# Patient Record
Sex: MALE | Race: WHITE | NOT HISPANIC OR LATINO | Employment: OTHER | URBAN - METROPOLITAN AREA
[De-identification: names, ages, dates, MRNs, and addresses within clinical notes are randomized per-mention and may not be internally consistent; named-entity substitution may affect disease eponyms.]

---

## 2022-12-13 ENCOUNTER — TRANSCRIBE ORDERS (OUTPATIENT)
Dept: ADMINISTRATIVE | Facility: HOSPITAL | Age: 77
End: 2022-12-13

## 2022-12-13 DIAGNOSIS — K44.9 DIAPHRAGMATIC HERNIA WITHOUT OBSTRUCTION OR GANGRENE: Primary | ICD-10-CM

## 2024-05-03 ENCOUNTER — TRANSCRIBE ORDERS (OUTPATIENT)
Dept: ADMINISTRATIVE | Facility: HOSPITAL | Age: 79
End: 2024-05-03
Payer: MEDICARE

## 2024-05-03 DIAGNOSIS — K44.9 DIAPHRAGMATIC HERNIA WITHOUT OBSTRUCTION OR GANGRENE: Primary | ICD-10-CM

## 2024-05-09 ENCOUNTER — HOSPITAL ENCOUNTER (OUTPATIENT)
Dept: GENERAL RADIOLOGY | Facility: HOSPITAL | Age: 79
Discharge: HOME | End: 2024-05-09
Admitting: SURGERY
Payer: MEDICARE

## 2024-05-09 DIAGNOSIS — K44.9 DIAPHRAGMATIC HERNIA WITHOUT OBSTRUCTION OR GANGRENE: ICD-10-CM

## 2024-05-09 PROCEDURE — 63710000001 BARIUM SULFATE 96 % RECONSTITUTED SUSPENSION: Performed by: SURGERY

## 2024-05-09 PROCEDURE — A9270 NON-COVERED ITEM OR SERVICE: HCPCS | Performed by: SURGERY

## 2024-05-09 PROCEDURE — 74220 X-RAY XM ESOPHAGUS 1CNTRST: CPT

## 2024-05-09 RX ADMIN — BARIUM SULFATE 183 ML: 960 POWDER, FOR SUSPENSION ORAL at 11:18

## 2024-05-31 PROCEDURE — 88305 TISSUE EXAM BY PATHOLOGIST: CPT

## 2024-06-03 ENCOUNTER — LAB REQUISITION (OUTPATIENT)
Dept: LAB | Facility: HOSPITAL | Age: 79
End: 2024-06-03
Payer: MEDICARE

## 2024-06-03 DIAGNOSIS — K44.9 DIAPHRAGMATIC HERNIA WITHOUT OBSTRUCTION OR GANGRENE: ICD-10-CM

## 2024-06-04 LAB — REF LAB TEST METHOD: NORMAL

## 2025-04-11 ENCOUNTER — HOSPITAL ENCOUNTER (INPATIENT)
Facility: HOSPITAL | Age: 80
LOS: 3 days | Discharge: NURSING, REHAB OR LONG-TERM CARE | DRG: 063 | End: 2025-04-14
Attending: EMERGENCY MEDICINE | Admitting: HOSPITALIST
Payer: MEDICARE

## 2025-04-11 ENCOUNTER — APPOINTMENT (OUTPATIENT)
Dept: CT IMAGING | Facility: HOSPITAL | Age: 80
DRG: 063 | End: 2025-04-11
Payer: MEDICARE

## 2025-04-11 ENCOUNTER — APPOINTMENT (OUTPATIENT)
Dept: GENERAL RADIOLOGY | Facility: HOSPITAL | Age: 80
DRG: 063 | End: 2025-04-11
Payer: MEDICARE

## 2025-04-11 ENCOUNTER — APPOINTMENT (OUTPATIENT)
Dept: MRI IMAGING | Facility: HOSPITAL | Age: 80
DRG: 063 | End: 2025-04-11
Payer: MEDICARE

## 2025-04-11 DIAGNOSIS — I63.9 ACUTE CVA (CEREBROVASCULAR ACCIDENT): ICD-10-CM

## 2025-04-11 DIAGNOSIS — R27.0 ATAXIA: ICD-10-CM

## 2025-04-11 DIAGNOSIS — Z86.79 HISTORY OF HYPERTENSION: ICD-10-CM

## 2025-04-11 DIAGNOSIS — R47.81 SLURRED SPEECH: ICD-10-CM

## 2025-04-11 DIAGNOSIS — R53.1 ACUTE LEFT-SIDED WEAKNESS: Primary | ICD-10-CM

## 2025-04-11 PROBLEM — G43.909 MIGRAINE: Status: ACTIVE | Noted: 2018-09-17

## 2025-04-11 PROBLEM — J30.2 SEASONAL ALLERGIC RHINITIS: Status: ACTIVE | Noted: 2020-10-27

## 2025-04-11 PROBLEM — E11.9 T2DM (TYPE 2 DIABETES MELLITUS): Chronic | Status: ACTIVE | Noted: 2025-04-11

## 2025-04-11 PROBLEM — E78.5 DYSLIPIDEMIA: Chronic | Status: ACTIVE | Noted: 2025-04-11

## 2025-04-11 PROBLEM — N40.0 BPH (BENIGN PROSTATIC HYPERPLASIA): Chronic | Status: ACTIVE | Noted: 2025-04-11

## 2025-04-11 PROBLEM — E55.9 VITAMIN D DEFICIENCY: Status: ACTIVE | Noted: 2023-09-14

## 2025-04-11 LAB
ALBUMIN SERPL-MCNC: 3.6 G/DL (ref 3.5–5.2)
ALBUMIN/GLOB SERPL: 1.2 G/DL
ALP SERPL-CCNC: 63 U/L (ref 39–117)
ALT SERPL W P-5'-P-CCNC: 13 U/L (ref 1–41)
ALT SERPL W P-5'-P-CCNC: 13 U/L (ref 1–41)
ANION GAP SERPL CALCULATED.3IONS-SCNC: 10 MMOL/L (ref 5–15)
APTT PPP: 28.8 SECONDS (ref 22–39)
AST SERPL-CCNC: 17 U/L (ref 1–40)
AST SERPL-CCNC: 18 U/L (ref 1–40)
BASOPHILS # BLD AUTO: 0.04 10*3/MM3 (ref 0–0.2)
BASOPHILS NFR BLD AUTO: 0.7 % (ref 0–1.5)
BILIRUB SERPL-MCNC: 1 MG/DL (ref 0–1.2)
BUN BLDA-MCNC: 16 MG/DL (ref 8–26)
BUN SERPL-MCNC: 15 MG/DL (ref 8–23)
BUN/CREAT SERPL: 16.9 (ref 7–25)
CA-I BLDA-SCNC: 1.31 MMOL/L (ref 1.2–1.32)
CALCIUM SPEC-SCNC: 9.7 MG/DL (ref 8.6–10.5)
CHLORIDE BLDA-SCNC: 103 MMOL/L (ref 98–109)
CHLORIDE SERPL-SCNC: 102 MMOL/L (ref 98–107)
CO2 BLDA-SCNC: 36 MMOL/L (ref 24–29)
CO2 SERPL-SCNC: 25 MMOL/L (ref 22–29)
CREAT BLDA-MCNC: 1 MG/DL (ref 0.6–1.3)
CREAT SERPL-MCNC: 0.89 MG/DL (ref 0.76–1.27)
DEPRECATED RDW RBC AUTO: 42.2 FL (ref 37–54)
EGFRCR SERPLBLD CKD-EPI 2021: 75.1 ML/MIN/1.73
EGFRCR SERPLBLD CKD-EPI 2021: 85.6 ML/MIN/1.73
EOSINOPHIL # BLD AUTO: 0.26 10*3/MM3 (ref 0–0.4)
EOSINOPHIL NFR BLD AUTO: 4.5 % (ref 0.3–6.2)
ERYTHROCYTE [DISTWIDTH] IN BLOOD BY AUTOMATED COUNT: 12.4 % (ref 12.3–15.4)
GLOBULIN UR ELPH-MCNC: 3.1 GM/DL
GLUCOSE BLDC GLUCOMTR-MCNC: 152 MG/DL (ref 70–130)
GLUCOSE BLDC GLUCOMTR-MCNC: 181 MG/DL (ref 70–130)
GLUCOSE BLDC GLUCOMTR-MCNC: 256 MG/DL (ref 70–130)
GLUCOSE SERPL-MCNC: 251 MG/DL (ref 65–99)
HCT VFR BLD AUTO: 42.2 % (ref 37.5–51)
HCT VFR BLDA CALC: 41 % (ref 38–51)
HGB BLD-MCNC: 13.5 G/DL (ref 13–17.7)
HGB BLDA-MCNC: 13.9 G/DL (ref 12–17)
HOLD SPECIMEN: NORMAL
IMM GRANULOCYTES # BLD AUTO: 0.02 10*3/MM3 (ref 0–0.05)
IMM GRANULOCYTES NFR BLD AUTO: 0.3 % (ref 0–0.5)
INR PPP: 0.99 (ref 0.89–1.12)
LYMPHOCYTES # BLD AUTO: 1.7 10*3/MM3 (ref 0.7–3.1)
LYMPHOCYTES NFR BLD AUTO: 29.2 % (ref 19.6–45.3)
MCH RBC QN AUTO: 29.6 PG (ref 26.6–33)
MCHC RBC AUTO-ENTMCNC: 32 G/DL (ref 31.5–35.7)
MCV RBC AUTO: 92.5 FL (ref 79–97)
MONOCYTES # BLD AUTO: 0.59 10*3/MM3 (ref 0.1–0.9)
MONOCYTES NFR BLD AUTO: 10.1 % (ref 5–12)
NEUTROPHILS NFR BLD AUTO: 3.21 10*3/MM3 (ref 1.7–7)
NEUTROPHILS NFR BLD AUTO: 55.2 % (ref 42.7–76)
NRBC BLD AUTO-RTO: 0 /100 WBC (ref 0–0.2)
PLATELET # BLD AUTO: 152 10*3/MM3 (ref 140–450)
PMV BLD AUTO: 11.6 FL (ref 6–12)
POTASSIUM BLDA-SCNC: 4.2 MMOL/L (ref 3.5–4.9)
POTASSIUM SERPL-SCNC: 4.5 MMOL/L (ref 3.5–5.2)
PROT SERPL-MCNC: 6.7 G/DL (ref 6–8.5)
PROTHROMBIN TIME: 13.7 SECONDS (ref 12.2–15.3)
RBC # BLD AUTO: 4.56 10*6/MM3 (ref 4.14–5.8)
SODIUM BLD-SCNC: 138 MMOL/L (ref 138–146)
SODIUM SERPL-SCNC: 137 MMOL/L (ref 136–145)
WBC NRBC COR # BLD AUTO: 5.82 10*3/MM3 (ref 3.4–10.8)
WHOLE BLOOD HOLD COAG: NORMAL
WHOLE BLOOD HOLD SPECIMEN: NORMAL

## 2025-04-11 PROCEDURE — 85014 HEMATOCRIT: CPT

## 2025-04-11 PROCEDURE — 82948 REAGENT STRIP/BLOOD GLUCOSE: CPT

## 2025-04-11 PROCEDURE — 4A03X5D MEASUREMENT OF ARTERIAL FLOW, INTRACRANIAL, EXTERNAL APPROACH: ICD-10-PCS | Performed by: EMERGENCY MEDICINE

## 2025-04-11 PROCEDURE — 0042T HC CT CEREBRAL PERFUSION W/WO CONTRAST: CPT

## 2025-04-11 PROCEDURE — 80053 COMPREHEN METABOLIC PANEL: CPT | Performed by: EMERGENCY MEDICINE

## 2025-04-11 PROCEDURE — 85730 THROMBOPLASTIN TIME PARTIAL: CPT | Performed by: EMERGENCY MEDICINE

## 2025-04-11 PROCEDURE — 25510000001 IOPAMIDOL PER 1 ML: Performed by: EMERGENCY MEDICINE

## 2025-04-11 PROCEDURE — 85610 PROTHROMBIN TIME: CPT | Performed by: EMERGENCY MEDICINE

## 2025-04-11 PROCEDURE — 99223 1ST HOSP IP/OBS HIGH 75: CPT

## 2025-04-11 PROCEDURE — 70450 CT HEAD/BRAIN W/O DYE: CPT

## 2025-04-11 PROCEDURE — 25010000002 TENECTEPLASE PER 50 MG: Performed by: EMERGENCY MEDICINE

## 2025-04-11 PROCEDURE — 70498 CT ANGIOGRAPHY NECK: CPT

## 2025-04-11 PROCEDURE — 99223 1ST HOSP IP/OBS HIGH 75: CPT | Performed by: INTERNAL MEDICINE

## 2025-04-11 PROCEDURE — 3E03317 INTRODUCTION OF OTHER THROMBOLYTIC INTO PERIPHERAL VEIN, PERCUTANEOUS APPROACH: ICD-10-PCS | Performed by: EMERGENCY MEDICINE

## 2025-04-11 PROCEDURE — 63710000001 INSULIN LISPRO (HUMAN) PER 5 UNITS: Performed by: INTERNAL MEDICINE

## 2025-04-11 PROCEDURE — 70551 MRI BRAIN STEM W/O DYE: CPT

## 2025-04-11 PROCEDURE — 80047 BASIC METABLC PNL IONIZED CA: CPT

## 2025-04-11 PROCEDURE — 85025 COMPLETE CBC W/AUTO DIFF WBC: CPT | Performed by: EMERGENCY MEDICINE

## 2025-04-11 PROCEDURE — 71045 X-RAY EXAM CHEST 1 VIEW: CPT

## 2025-04-11 PROCEDURE — 70496 CT ANGIOGRAPHY HEAD: CPT

## 2025-04-11 PROCEDURE — 99285 EMERGENCY DEPT VISIT HI MDM: CPT

## 2025-04-11 RX ORDER — SODIUM CHLORIDE 0.9 % (FLUSH) 0.9 %
10 SYRINGE (ML) INJECTION AS NEEDED
Status: DISCONTINUED | OUTPATIENT
Start: 2025-04-11 | End: 2025-04-14 | Stop reason: HOSPADM

## 2025-04-11 RX ORDER — ATORVASTATIN CALCIUM 20 MG/1
20 TABLET, FILM COATED ORAL DAILY
Status: ON HOLD | COMMUNITY
End: 2025-04-14

## 2025-04-11 RX ORDER — SODIUM CHLORIDE 0.9 % (FLUSH) 0.9 %
10 SYRINGE (ML) INJECTION ONCE
Status: COMPLETED | OUTPATIENT
Start: 2025-04-11 | End: 2025-04-11

## 2025-04-11 RX ORDER — FERROUS SULFATE 324(65)MG
324 TABLET, DELAYED RELEASE (ENTERIC COATED) ORAL
COMMUNITY

## 2025-04-11 RX ORDER — ATORVASTATIN CALCIUM 20 MG/1
20 TABLET, FILM COATED ORAL NIGHTLY
Status: DISCONTINUED | OUTPATIENT
Start: 2025-04-11 | End: 2025-04-14 | Stop reason: HOSPADM

## 2025-04-11 RX ORDER — SODIUM CHLORIDE 0.9 % (FLUSH) 0.9 %
10 SYRINGE (ML) INJECTION EVERY 12 HOURS SCHEDULED
Status: DISCONTINUED | OUTPATIENT
Start: 2025-04-11 | End: 2025-04-11

## 2025-04-11 RX ORDER — ACETAMINOPHEN 500 MG
500 TABLET ORAL EVERY 6 HOURS PRN
COMMUNITY
End: 2025-04-14 | Stop reason: HOSPADM

## 2025-04-11 RX ORDER — IBUPROFEN 600 MG/1
1 TABLET ORAL
Status: DISCONTINUED | OUTPATIENT
Start: 2025-04-11 | End: 2025-04-14 | Stop reason: HOSPADM

## 2025-04-11 RX ORDER — SODIUM CHLORIDE 0.9 % (FLUSH) 0.9 %
10 SYRINGE (ML) INJECTION
Status: COMPLETED | OUTPATIENT
Start: 2025-04-11 | End: 2025-04-11

## 2025-04-11 RX ORDER — SODIUM CHLORIDE 9 MG/ML
40 INJECTION, SOLUTION INTRAVENOUS AS NEEDED
Status: DISCONTINUED | OUTPATIENT
Start: 2025-04-11 | End: 2025-04-14 | Stop reason: HOSPADM

## 2025-04-11 RX ORDER — ASPIRIN 325 MG
325 TABLET ORAL DAILY
Status: DISCONTINUED | OUTPATIENT
Start: 2025-04-12 | End: 2025-04-13

## 2025-04-11 RX ORDER — MULTIVITAMIN WITH IRON
500 TABLET ORAL DAILY
COMMUNITY

## 2025-04-11 RX ORDER — IOPAMIDOL 755 MG/ML
125 INJECTION, SOLUTION INTRAVASCULAR
Status: COMPLETED | OUTPATIENT
Start: 2025-04-11 | End: 2025-04-11

## 2025-04-11 RX ORDER — SODIUM CHLORIDE 0.9 % (FLUSH) 0.9 %
10 SYRINGE (ML) INJECTION AS NEEDED
Status: DISCONTINUED | OUTPATIENT
Start: 2025-04-11 | End: 2025-04-11

## 2025-04-11 RX ORDER — DEXTROSE MONOHYDRATE 25 G/50ML
25 INJECTION, SOLUTION INTRAVENOUS
Status: DISCONTINUED | OUTPATIENT
Start: 2025-04-11 | End: 2025-04-14 | Stop reason: HOSPADM

## 2025-04-11 RX ORDER — ASPIRIN 300 MG/1
300 SUPPOSITORY RECTAL DAILY
Status: DISCONTINUED | OUTPATIENT
Start: 2025-04-12 | End: 2025-04-13

## 2025-04-11 RX ORDER — INSULIN LISPRO 100 [IU]/ML
2-9 INJECTION, SOLUTION INTRAVENOUS; SUBCUTANEOUS
Status: DISCONTINUED | OUTPATIENT
Start: 2025-04-11 | End: 2025-04-14 | Stop reason: HOSPADM

## 2025-04-11 RX ORDER — MULTIVIT-MIN/IRON FUM/FOLIC AC 7.5 MG-4
1 TABLET ORAL DAILY
COMMUNITY

## 2025-04-11 RX ORDER — NICOTINE POLACRILEX 4 MG
15 LOZENGE BUCCAL
Status: DISCONTINUED | OUTPATIENT
Start: 2025-04-11 | End: 2025-04-14 | Stop reason: HOSPADM

## 2025-04-11 RX ADMIN — IOPAMIDOL 125 ML: 755 INJECTION, SOLUTION INTRAVENOUS at 12:52

## 2025-04-11 RX ADMIN — ATORVASTATIN CALCIUM 20 MG: 20 TABLET, FILM COATED ORAL at 21:34

## 2025-04-11 RX ADMIN — INSULIN LISPRO 2 UNITS: 100 INJECTION, SOLUTION INTRAVENOUS; SUBCUTANEOUS at 17:36

## 2025-04-11 RX ADMIN — Medication 10 ML: at 13:39

## 2025-04-11 RX ADMIN — Medication 19 MG: at 13:39

## 2025-04-11 RX ADMIN — INSULIN LISPRO 2 UNITS: 100 INJECTION, SOLUTION INTRAVENOUS; SUBCUTANEOUS at 21:34

## 2025-04-11 RX ADMIN — MUPIROCIN 1 APPLICATION: 20 OINTMENT TOPICAL at 22:41

## 2025-04-11 NOTE — CASE MANAGEMENT/SOCIAL WORK
Discharge Planning Assessment  [facility]     Patient Name: Jesse Chapin  MRN: [id number]  Today's Date: 4/11/2025    Admit Date: 4/11/2025    Plan: IDP   Discharge Needs Assessment       Row Name 04/11/25 1444       Living Environment    People in Home spouse    Current Living Arrangements home    Primary Care Provided by self    Family Caregiver if Needed spouse    Quality of Family Relationships helpful;involved    Able to Return to Prior Arrangements yes       Transition Planning    Patient/Family Anticipates Transition to home    Transportation Anticipated family or friend will provide       Discharge Needs Assessment    Readmission Within the Last 30 Days no previous admission in last 30 days    Equipment Currently Used at Home cane, straight    Concerns to be Addressed denies needs/concerns at this time                   Discharge Plan       Row Name 04/11/25 1445       Plan    Plan IDP    Plan Comments MSW met with Pt and spouse at bedside to complete IDP. Pt lives with spouse in [facility]. pt’s PCP is Andree Burroughs and Pt has Medicare and [employer] insurance coverage. Pt independent at baseline; Pt has cane PRN, no HH, and no O2. Pt’s preferred pharmacy is [facility]. Pt still drives and spouse can transport when medically ready. Pt denied needs or concerns. CM will continue to follow.                       Demographic Summary       Row Name 04/11/25 1444       General Information    Arrived From home    Referral Source admission list;emergency department    Reason for Consult discharge planning    Preferred Language English                   Functional Status       Row Name 04/11/25 1444       Functional Status    Usual Activity Tolerance good    Current Activity Tolerance good       Functional Status, IADL    Medications independent    Meal Preparation independent    Housekeeping independent    Laundry independent    Shopping independent                               RUDDY Taylor

## 2025-04-11 NOTE — ED PROVIDER NOTES
"  [place]    EMERGENCY DEPARTMENT ENCOUNTER      Pt Name: Jesse Chapin  MRN: [id number]  YOB: 1945  Date of evaluation: 4/11/2025  Provider: Jose Devi MD    CHIEF COMPLAINT       Chief Complaint   Patient presents with    Stroke         HISTORY OF PRESENT ILLNESS   Jesse Chapin is a 80 y.o. male who presents to the emergency department with left-sided weakness, slurred speech, and gait ataxia that began at around 9 AM per his estimation.  He reports that he got up to use the restroom at 8 AM this morning and was in his normal state of health.  He went back to bed and when he got up at 9, he had the aforementioned symptoms.  He says that they have now improved.  He has no associated headache, neck pain, or chest pain and reports no prior history of stroke or TIA.  He does not take any anticoagulant medications      Nursing notes were reviewed.    REVIEW OF SYSTEMS     ROS:  A chief complaint appropriate review of systems was completed and is negative except as noted in the HPI.      PAST MEDICAL HISTORY   No past medical history on file.      SURGICAL HISTORY     No past surgical history on file.      CURRENT MEDICATIONS       Current Facility-Administered Medications:     sodium chloride 0.9 % flush 10 mL, 10 mL, Intravenous, PRN, Jose Devi MD  No current outpatient medications on file.    ALLERGIES     Patient has no known allergies.    FAMILY HISTORY     No family history on file.       SOCIAL HISTORY       Social History     Socioeconomic History    Marital status:          PHYSICAL EXAM    (up to 7 for level 4, 8 or more for level 5)     Vitals:    04/11/25 1222 04/11/25 1300 04/11/25 1330   BP: 124/65     Patient Position: Sitting     Pulse: 91 88 78   Resp: 16     Temp: 97.8 °F (36.6 °C)     TempSrc: Oral     SpO2: 94% 94% 95%   Weight: 74.8 kg (165 lb)     Height: 170.2 cm (67\")         General: Awake, alert, no acute distress.  HEENT: Conjunctivae normal.  Neck: " Trachea midline.  Cardiac: Heart regular rate, rhythm, no murmurs, rubs, or gallops  Lungs: Lungs are clear to auscultation, there is no wheezing, rhonchi, or rales. There is no use of accessory muscles.  Chest wall: There is no tenderness to palpation over the chest wall or over ribs  Abdomen: Abdomen is soft, nontender, nondistended. There are no firm or pulsatile masses, no rebound rigidity or guarding.   Musculoskeletal: No deformity.  Neuro: Alert and oriented x4.  Cranial nerves II through XII are grossly intact.  There are no visual field deficits.  Cerebellar function intact with finger-to-nose and heel-to-shin testing.  Patient observed ambulating by myself and there is no evidence of ataxia or other gait abnormality. Weakness and numbness in the L hand. Sensation to light touch is intact in all 4 extremities without any asymmetry.  Dermatology: Skin is warm and dry  Psych: Mentation is grossly normal, cognition is grossly normal. Affect is appropriate.        DIAGNOSTIC RESULTS     EKG: All EKGs are interpreted by the Emergency Department Physician who either signs or Co-signs this chart in the absence of a cardiologist.    ECG 12 Lead ED Triage Standing Order; Acute Stroke (Onset <24 hrs)    (Results Pending)         RADIOLOGY:   [x] Radiologist's Report Reviewed:  MRI Brain WO CON Hyper Acute Stroke Protocol   Final Result   Impression:   Small cortical infarcts seen along the right frontal and parietal lobes. There is no associated FLAIR signal abnormality at these areas suggesting early/hyperacute infarct.      Findings of infarct discussed with NP KASHIF HERNANDEZ by Dr. Drew Browning via telephone on 4/11/2025 1:29 PM EDT.      Electronically Signed: Drew Browning MD     4/11/2025 1:33 PM EDT     Workstation ID: [id number]      CT Angiogram Head w AI Analysis of LVO   Final Result   1.No hemodynamically significant stenosis, large vessel cut off or aneurysms in the intracranial  circulation   2.Mild to moderate carotid atherosclerotic disease, right greater than left, with approximately 50% stenosis involving the proximal right ICA.                  Electronically Signed: Warren Beasley MD     4/11/2025 1:05 PM EDT     Workstation ID: [id number]      CT Angiogram Neck   Final Result   1.No hemodynamically significant stenosis, large vessel cut off or aneurysms in the intracranial circulation   2.Mild to moderate carotid atherosclerotic disease, right greater than left, with approximately 50% stenosis involving the proximal right ICA.                  Electronically Signed: Warren Beasley MD     4/11/2025 1:05 PM EDT     Workstation ID: [id number]      CT CEREBRAL PERFUSION WITH & WITHOUT CONTRAST   Final Result       1. No evidence of core infarct nor ischemic brain at risk.               Electronically Signed: Warren Beasley MD     4/11/2025 1:02 PM EDT     Workstation ID: [id number]      CT Head Without Contrast Stroke Protocol   Final Result   Impression:   1.No evidence of acute intracranial hemorrhage or large territory infarct.   2.Age-indeterminate but possibly old small infarct in the right frontal lobe.   3.Chronic changes as above.            Electronically Signed: Drew Browning MD     4/11/2025 12:42 PM EDT     Workstation ID: [id number]      XR Chest 1 View    (Results Pending)       I ordered and independently reviewed the above noted radiographic studies.        LABS:    I have reviewed and interpreted all of the currently available lab results from this visit (if applicable):  Results for orders placed or performed during the hospital encounter of 04/11/25   Protime-INR    Collection Time: 04/11/25 12:36 PM    Specimen: Blood   Result Value Ref Range    Protime 13.7 12.2 - 15.3 Seconds    INR 0.99 0.89 - 1.12   aPTT    Collection Time: 04/11/25 12:36 PM    Specimen: Blood   Result Value Ref Range    PTT 28.8 22.0 - 39.0 seconds   AST    Collection Time: 04/11/25 12:36 PM     Specimen: Blood   Result Value Ref Range    AST (SGOT) 18 1 - 40 U/L   ALT    Collection Time: 04/11/25 12:36 PM    Specimen: Blood   Result Value Ref Range    ALT (SGPT) 13 1 - 41 U/L   CBC Auto Differential    Collection Time: 04/11/25 12:36 PM    Specimen: Blood   Result Value Ref Range    WBC 5.82 3.40 - 10.80 10*3/mm3    RBC 4.56 4.14 - 5.80 10*6/mm3    Hemoglobin 13.5 13.0 - 17.7 g/dL    Hematocrit 42.2 37.5 - 51.0 %    MCV 92.5 79.0 - 97.0 fL    MCH 29.6 26.6 - 33.0 pg    MCHC 32.0 31.5 - 35.7 g/dL    RDW 12.4 12.3 - 15.4 %    RDW-SD 42.2 37.0 - 54.0 fl    MPV 11.6 6.0 - 12.0 fL    Platelets 152 140 - 450 10*3/mm3    Neutrophil % 55.2 42.7 - 76.0 %    Lymphocyte % 29.2 19.6 - 45.3 %    Monocyte % 10.1 5.0 - 12.0 %    Eosinophil % 4.5 0.3 - 6.2 %    Basophil % 0.7 0.0 - 1.5 %    Immature Grans % 0.3 0.0 - 0.5 %    Neutrophils, Absolute 3.21 1.70 - 7.00 10*3/mm3    Lymphocytes, Absolute 1.70 0.70 - 3.10 10*3/mm3    Monocytes, Absolute 0.59 0.10 - 0.90 10*3/mm3    Eosinophils, Absolute 0.26 0.00 - 0.40 10*3/mm3    Basophils, Absolute 0.04 0.00 - 0.20 10*3/mm3    Immature Grans, Absolute 0.02 0.00 - 0.05 10*3/mm3    nRBC 0.0 0.0 - 0.2 /100 WBC   Comprehensive Metabolic Panel    Collection Time: 04/11/25 12:36 PM    Specimen: Blood   Result Value Ref Range    Glucose 251 (H) 65 - 99 mg/dL    BUN 15 8 - 23 mg/dL    Creatinine 0.89 0.76 - 1.27 mg/dL    Sodium 137 136 - 145 mmol/L    Potassium 4.5 3.5 - 5.2 mmol/L    Chloride 102 98 - 107 mmol/L    CO2 25.0 22.0 - 29.0 mmol/L    Calcium 9.7 8.6 - 10.5 mg/dL    Total Protein 6.7 6.0 - 8.5 g/dL    Albumin 3.6 3.5 - 5.2 g/dL    ALT (SGPT) 13 1 - 41 U/L    AST (SGOT) 17 1 - 40 U/L    Alkaline Phosphatase 63 39 - 117 U/L    Total Bilirubin 1.0 0.0 - 1.2 mg/dL    Globulin 3.1 gm/dL    A/G Ratio 1.2 g/dL    BUN/Creatinine Ratio 16.9 7.0 - 25.0    Anion Gap 10.0 5.0 - 15.0 mmol/L    eGFR 85.6 >60.0 mL/min/1.73   Green Top (Gel)    Collection Time: 04/11/25 12:36 PM   Result  Value Ref Range    Extra Tube Hold for add-ons.    Lavender Top    Collection Time: 04/11/25 12:36 PM   Result Value Ref Range    Extra Tube hold for add-on    Gold Top - SST    Collection Time: 04/11/25 12:36 PM   Result Value Ref Range    Extra Tube Hold for add-ons.    Gray Top    Collection Time: 04/11/25 12:36 PM   Result Value Ref Range    Extra Tube Hold for add-ons.    Light Blue Top    Collection Time: 04/11/25 12:36 PM   Result Value Ref Range    Extra Tube Hold for add-ons.    POC CHEM 8    Collection Time: 04/11/25 12:40 PM    Specimen: Blood   Result Value Ref Range    Glucose 256 (H) 70 - 130 mg/dL    BUN 16 8 - 26 mg/dL    Creatinine 1.00 0.60 - 1.30 mg/dL    Sodium 138 138 - 146 mmol/L    POC Potassium 4.2 3.5 - 4.9 mmol/L    Chloride 103 98 - 109 mmol/L    Total CO2 36 (H) 24 - 29 mmol/L    Hemoglobin 13.9 12.0 - 17.0 g/dL    Hematocrit 41 38 - 51 %    Ionized Calcium 1.31 1.20 - 1.32 mmol/L    eGFR 75.1 >60.0 mL/min/1.73        If labs were ordered, I independently reviewed the results and considered them in treating the patient.      EMERGENCY DEPARTMENT COURSE and DIFFERENTIAL DIAGNOSIS/MDM:   Vitals:  AS OF 13:42 EDT    BP - 124/65  HR - 78  TEMP - 97.8 °F (36.6 °C) (Oral)  O2 SATS - 95%        Discussion below represents my analysis of pertinent findings related to patient's condition, differential diagnosis, treatment plan and final disposition.      Differential diagnosis:  The differential diagnosis associated with the patient's presentation includes: CVA, TIA, intracranial hemorrhage, cranial mass, migraine, seizure, UTI, electrolyte derangement      Independent interpretations (ECG/rhythm strip/X-ray/US/CT scan): I independently interpreted the patient's head CT and chest x-ray.  There is no evidence of pulmonary infiltrate and no evidence of ICH      Additional sources:  Discussed/obtained information from independent historians:   [x] Spouse: Additional history obtained from the  patient's family member at bedside.  Reports that patient has been off balance when walking this morning when he was taking the trash out   [] Parent:   [] Friend:   [] EMS:   [] Other:  External (non-ED) record review:   [] Inpatient record:   [] Office record:   [] Outpatient record:   [] Prior Outpatient labs:   [] Prior Outpatient radiology:   [] Primary Care record:   [] Outside ED record:   [] Other:       Patient's care impacted by:   [] Diabetes   [x] Hypertension   [] Coronary Artery Disease   [] Cancer   [] Other:     Care significantly affected by Social Determinants of Health (housing and economic circumstances, unemployment)    [] Yes     [x] No   If yes, Patient's care significantly limited by  Social Determinants of Health including:    [] Inadequate housing    [] Low income    [] Alcoholism and drug addiction in family    [] Problems related to primary support group    [] Unemployment    [] Problems related to employment    [] Other Social Determinants of Health:       ED Course:    ED Course as of 04/11/25 1342   Fri Apr 11, 2025   1331 I spoke with Merary with the stroke team.  Patient currently has an NIHSS of 0 for her and therefore does not need TNK.  She had discussed with neurologist Dr. Cooper who recommended hyperacute MRI which the patient is being taken for.  No evidence of large vessel occlusion [NS]   1337 I was just informed by stroke team that patient does have positive findings on MRI and the patient and his wife would like to proceed with TNK administration.  They have reviewed all contraindications of which the patient has none, risk reviewed including risk of hemorrhage and death, patient wished to proceed with administration. [NS]   1341 I discussed case with intensivist Dr. Medrano.  Discussed history, presentation, workup.  Accepts patient for admission. [NS]      ED Course User Index  [NS] Jose Devi MD         CRITICAL CARE TIME    Approximately 35 minutes of  discontinuous critical care time was provided to this patient by myself absent of any time spent performing procedures.  Patient presents critically ill with acute ischemic stroke placing the neurologic system at risk requiring the following interventions: Administration of tenecteplase, interpretation of lab/ECG/imaging, multiple conversations with specialists, coordination of ICU admission.  Patient at high risk of deterioration and possibly death without these interventions.      FINAL IMPRESSION      1. Acute left-sided weakness    2. Slurred speech    3. Ataxia    4. History of hypertension          DISPOSITION/PLAN     ED Disposition       ED Disposition   Decision to Admit    Condition   --    Comment   --                 Comment: Please note this report has been produced using speech recognition software.      Jose Devi MD  Attending Emergency Physician             Jose Devi MD  04/11/25 2614       Jose Devi MD  04/11/25 3250

## 2025-04-11 NOTE — Clinical Note
Level of Care: Critical Care [6]   Admitting Physician: KARINE MULLINS [[id number]]   Attending Physician: KARINE MULLINS [[id number]]

## 2025-04-11 NOTE — H&P
"      Chief complaint: Altered gait and incoordination    Subjective     Jesse Chapin is a 80 y.o. male who presents to [facility] ED 04/11/25 with acute CVA.     Patient has documented medical diagnoses including T2DM, dyslipidemia, BPH, and GERD.    Per report, at 0900 this morning patient developed acute onset of left hand incoordination and gait instability, prompting presentation to our ED for further evaluation. Upon ED arrival initial  NIHSS 0 and CTH negative for hemorrhage with noted age-indeterminate but possibly old small infarct in the right frontal lobe. Subsequent CTA H/N was negative for flow-limiting stenosis and CTP negative for PFO. MRI brain showed small cortical infarcts seen along the right frontal and parietal lobes with no associated FLAIR abnormality, suggesting early / hyperacute infarct. He was evaluated by our stroke service and was ultimately deemed a candidate for thrombolytic therapy with TNKase administered. He will be admitted to ICU for further management and comprehensive CVA workup.     Time spent: 7 minutes  Electronically signed by Tiff Stoner DNP, APRN, 04/11/25, 2:29 PM EDT.     History  Past Medical History:   Diagnosis Date    BPH (benign prostatic hyperplasia) 04/11/2025    Dyslipidemia 04/11/2025    T2DM (type 2 diabetes mellitus) 04/11/2025     No past surgical history on file.  History reviewed. No pertinent family history.       Review of Systems   Pertinent items are noted in HPI      Objective     Vital Signs  Temp:  [97.8 °F (36.6 °C)] 97.8 °F (36.6 °C)  Heart Rate:  [66-91] 66  Resp:  [16] 16  BP: (112-128)/(63-81) 112/65    Physical Exam:    Objective:  General Appearance:  In no acute distress.    Vital signs: (most recent): Blood pressure 112/65, pulse 66, temperature 97.8 °F (36.6 °C), temperature source Oral, resp. rate 16, height 170.2 cm (67\"), weight 74.8 kg (165 lb), SpO2 98%.    HEENT: Normal HEENT exam.    Lungs:  Normal effort and normal respiratory rate.  " Breath sounds clear to auscultation.  He is not in respiratory distress.  No rales, wheezes or rhonchi.    Heart: Normal rate.  Regular rhythm.  S1 normal and S2 normal.  No murmur, gallop or friction rub.   Chest: Symmetric chest wall expansion.   Abdomen: Abdomen is soft and non-distended.  Bowel sounds are normal.   There is no abdominal tenderness.   There is no mass. There is no splenomegaly. There is no hepatomegaly.   Extremities: There is no deformity or dependent edema.    Neurological: Patient is alert and oriented to person, place and time.    Pupils:  Pupils are equal, round, and reactive to light.    Skin:  Warm and dry.                   Results Review:    I reviewed the patient's new clinical results.  I reviewed the patient's new imaging results and agree with the interpretation.  I reviewed the patient's other test results and agree with the interpretation    Assessment & Plan     Assessment:    Active Hospital Problems    Diagnosis     **Acute CVA (cerebrovascular accident)     Dyslipidemia     T2DM (type 2 diabetes mellitus)        80-year-old male with a past medical history significant for type 2 diabetes mellitus, dyslipidemia, and migraine headaches.  He presents to the emergency department today complaining of difficulty walking, dysarthria, and left hand incoordination.  He noticed this when he woke at 09 100.  He had previously been awake briefly at 0800 and noted no difficulty.  His wife was at the store when she returned she brought him to the ER.  His symptoms had improved somewhat prior to evaluation here.  A hyperacute MRI revealed small cortical infarct seen along the right frontal and parietal lobes.  There was associated FLAIR signal abnormalities in these areas suggesting an acute infarct.  Routine CT imaging and perfusion without significant abnormalities.  There was some mild to moderate atherosclerotic disease noted on angiogram.    He received TNK at 1354.    Plan:    Neuro ICU  admission  Standard postthrombolytic order set  24-hour head CT tomorrow afternoon  Further recommendations for antiplatelet agents by stroke service after the above  Maintain SBP in standard range  Echocardiogram  OT/ST/SLP evaluation  Hemoglobin A1c and lipid panel    I discussed the patients findings and my recommendations with patient, family, nursing staff, and Dr Devi .     Level of Risk High due to: drug(s) requiring intensive monitoring for toxicity      Ruben Hawkins MD

## 2025-04-11 NOTE — CONSULTS
Stroke Consult Note    Patient Name: Jesse Chapin   MRN: [id number]  Age: 80 y.o.  Sex: male  : 1945    Primary Care Physician: Provider, No Known  Referring Physician:  Dr. Jose Devi    TIME STROKE TEAM CALLED: 1230 EST     TIME PATIENT SEEN: 1232 EST    Handedness: Right  Race:      Chief Complaint/Reason for Consultation: Gait abnormality and left hand incoordination    HPI: Mr. Chapin is an 80-year-old male with known medical diagnoses of diabetes mellitus type 2, hyperlipidemia, GERD, migraines, and left hip fracture with nailing (2023) presents emergency department via private vehicle for further evaluation of difficulty walking and difficulty with left hand coordination.  Patient states that he got up at 0800 this morning and was able to ambulate to the bathroom without difficulty, he does live alone therefore no one was able to confirm this.  When he awoke again at 0900 he states that he was having difficulty walking out to the trash and came to our facility for further evaluation.    The patient tells me that he lives with his wife and walks with a cane.  He is independent of all of his ADLs.  He does not currently take any antiplatelet or anticoagulation medications.    Upon his wife's arrival, she tells me that he seems slower with his gait than normal and that his speech seems less fluent.    Last Known Normal Date/Time: 0900 EST     Review of Systems   Constitutional:  Positive for activity change. Negative for chills, fatigue and fever.   HENT: Negative.     Eyes: Negative.  Negative for photophobia and visual disturbance.   Respiratory: Negative.     Cardiovascular: Negative.  Negative for chest pain and palpitations.   Gastrointestinal: Negative.  Negative for blood in stool.   Genitourinary: Negative.  Negative for hematuria.   Musculoskeletal:  Positive for gait problem.   Skin: Negative.    Neurological:  Positive for weakness. Negative for dizziness, speech difficulty,  numbness and headaches.   Hematological: Negative.    Psychiatric/Behavioral: Negative.        No past medical history on file.  No past surgical history on file.  No family history on file.  Social History     Socioeconomic History    Marital status:      No Known Allergies  Prior to Admission medications    Not on File         Temp:  [97.8 °F (36.6 °C)] 97.8 °F (36.6 °C)  Heart Rate:  [88-91] 88  Resp:  [16] 16  BP: (124)/(65) 124/65  Neurological Exam  Mental Status  Alert. Oriented to person, place, time and situation. Oriented to person, place, and time. Speech is normal. Language is fluent with no aphasia. Attention and concentration are normal.    Cranial Nerves  CN II: Visual fields full to confrontation.  CN III, IV, VI: Extraocular movements intact bilaterally. Pupils equal round and reactive to light bilaterally.  CN V: Facial sensation is normal.  CN VII: Full and symmetric facial movement.  CN VIII: Hearing appears to be intact bilaterally.  CN XII: Tongue midline without atrophy or fasciculations.    Motor  Decreased muscle bulk throughout.  Bilateral upper extremities with no drift, slightly weaker  in left than right, 4+/5 strength  Bilateral lower extremities with no drift, 4+/5 strength.    Sensory  Sensation is intact to light touch, pinprick, vibration and proprioception in all four extremities.    Coordination    No obvious dysmetria noted.    Gait  Casual gait: Narrow stance. Shuffling gait.      Physical Exam  Vitals and nursing note reviewed.   Constitutional:       General: He is not in acute distress.     Appearance: Normal appearance. He is not ill-appearing.   HENT:      Head: Normocephalic.      Mouth/Throat:      Mouth: Mucous membranes are moist.   Eyes:      Extraocular Movements: Extraocular movements intact.      Pupils: Pupils are equal, round, and reactive to light.   Cardiovascular:      Rate and Rhythm: Normal rate.   Pulmonary:      Effort: Pulmonary effort is  normal. No respiratory distress.      Comments: On room air  Skin:     General: Skin is warm and dry.   Neurological:      Mental Status: He is alert and oriented to person, place, and time.      Cranial Nerves: No cranial nerve deficit.      Sensory: No sensory deficit.      Motor: No weakness.      Coordination: Coordination normal.   Psychiatric:         Mood and Affect: Mood normal.         Speech: Speech normal.         Behavior: Behavior normal.         Acute Stroke Data    Thrombolytic Inclusion / Exclusion Criteria    Time: 13:12 EDT  Person Administering Scale: ERNIE Calabrese      YES NO INCLUSION CRITERIA CLASS I   [] [x]   Suspected diagnosis of acute ischemic stroke with measureable neurological deficit.  Low NIHSS with disabling stroke symptoms.   [] [x]   Onset of stroke symptoms < 3 hours before beginning treatment >/ 18 years old  Stroke symptom onset = time patient was last seen well or without symptoms (LKW)   [] [x]   Onset of symptoms between 3-4.5 hours: >/= 80 years old (safe Class IIa) with history of   both diabetes and prior CVA  (reasonable Class IIb) AND NIHSS </= 25  *If not eligible for IV Thrombolytic consider neuro intervention for LKW within 24 hours     YES NO EXCLUSION CRITERIA (CONTRAINDICATIONS) CLASS III EVIDENCE HARM   [] []   Blood pressure >185/110 medically refractory to IV medications   [] []   Active bleeding at a non-compressible site   [] []   Active intracranial hemorrhage (ICH)   [] []   Symptoms suggestive of subarachnoid hemorrhage (SAH)   [] []   GI bleed within 21 days   [] []   Ischemic stroke within 3 months   [] []   Severe head trauma within 3 months    [] []   Intracranial or intraspinal surgery within 3 months   [] []   Current GI malignancy   [] []   Intracranial neoplasm   [] []   Infective endocarditis   [] []   Aortic arch dissection   [] []   Active coagulopathy with  INR >1.7, platelets <100,000, PTT > 40 sec, PT > 15 sec   *For warfarin,  administration can begin before blood tests resulted. Discontinue for above values.    [] []   Treatment dose* of LMWH (Lovenox) in last 24 hours  *prophylactic dosages are not a contraindication   [] []   Concurrent use of antiplatelet agents' glycoprotein inhibitors IIb/IIIa (Integrilin, etc.)   [] []   Thrombin or factor Xa inhibitors (Eliquis, Xarelto, Arixtra) taken in last 48 hours     YES NO CLASS II: AIS WITH THE FOLLOWING CONDITIONS -   TREATMENT RISKS SHOULD BE WEIGHED AGAINST POSSIBLE BENEFITS.    [] []   Major trauma in last 14 days, recent major surgery in last 14 days, intracranial arterial dissection, giant unruptured and unsecured intracranial aneurysm, pericarditis     [] []   The risks and benefits have been discussed with the patient or family related to the administration of IV thrombolytic therapy for stroke symptoms.   [] []   I have discussed and reviewed the patient's case and imaging with the attending prior to IV thrombolytic therapy.   TIME N/A Time IV thrombolytic administered       Hospital Meds:  Scheduled-    Infusions-     PRNs-   sodium chloride    Functional Status Prior to Current Stroke/[place] Score: 0    NIH Stroke Scale  Time: 13:12 EDT  Person Administering Scale: ERNIE Calabrese    Interval: baseline  1a. Level of Consciousness: 0-->Alert, keenly responsive  1b. LOC Questions: 0-->Answers both questions correctly  1c. LOC Commands: 0-->Performs both tasks correctly  2. Best Gaze: 0-->Normal  3. Visual: 0-->No visual loss  4. Facial Palsy: 0-->Normal symmetrical movements  5a. Motor Arm, Left: 0-->No drift, limb holds 90 (or 45) degrees for full 10 secs  5b. Motor Arm, Right: 0-->No drift, limb holds 90 (or 45) degrees for full 10 secs  6a. Motor Leg, Left: 0-->No drift, leg holds 30 degree position for full 5 secs  6b. Motor Leg, Right: 0-->No drift, leg holds 30 degree position for full 5 secs  7. Limb Ataxia: 0-->Absent  8. Sensory: 0-->Normal, no sensory  loss  9. Best Language: 0-->No aphasia, normal  10. Dysarthria: 0-->Normal  11. Extinction and Inattention (formerly Neglect): 0-->No abnormality    Total (NIH Stroke Scale): 0    Results Reviewed:  I have personally reviewed current lab, radiology, and data and agree with results.     CT Angiogram Head w AI Analysis of LVO  Result Date: 4/11/2025  1.No hemodynamically significant stenosis, large vessel cut off or aneurysms in the intracranial circulation 2.Mild to moderate carotid atherosclerotic disease, right greater than left, with approximately 50% stenosis involving the proximal right ICA. Electronically Signed: Warren Beasley MD  4/11/2025 1:05 PM EDT  Workstation ID: [id number]    CT Angiogram Neck  Result Date: 4/11/2025  1.No hemodynamically significant stenosis, large vessel cut off or aneurysms in the intracranial circulation 2.Mild to moderate carotid atherosclerotic disease, right greater than left, with approximately 50% stenosis involving the proximal right ICA. Electronically Signed: Warren Beasley MD  4/11/2025 1:05 PM EDT  Workstation ID: [id number]    CT CEREBRAL PERFUSION WITH & WITHOUT CONTRAST  Result Date: 4/11/2025   1. No evidence of core infarct nor ischemic brain at risk. Electronically Signed: Warren Beasley MD  4/11/2025 1:02 PM EDT  Workstation ID: [id number]    CT Head Without Contrast Stroke Protocol  Result Date: 4/11/2025  Impression: 1.No evidence of acute intracranial hemorrhage or large territory infarct. 2.Age-indeterminate but possibly old small infarct in the right frontal lobe. 3.Chronic changes as above. Electronically Signed: Drew Browning MD  4/11/2025 12:42 PM EDT  Workstation ID: [id number]      WBC   Date Value Ref Range Status   04/11/2025 5.82 3.40 - 10.80 10*3/mm3 Final     Hemoglobin   Date Value Ref Range Status   04/11/2025 13.9 12.0 - 17.0 g/dL Final     Comment:     Serial Number: [id number]:  [id number]   04/11/2025 13.5 13.0 - 17.7 g/dL Final     Hematocrit   Date  Value Ref Range Status   04/11/2025 41 38 - 51 % Final   04/11/2025 42.2 37.5 - 51.0 % Final     Platelets   Date Value Ref Range Status   04/11/2025 152 140 - 450 10*3/mm3 Final     Lab Results   Component Value Date    GLUCOSE 251 (H) 04/11/2025    BUN 15 04/11/2025    CREATININE 1.00 04/11/2025     04/11/2025    K 4.5 04/11/2025     04/11/2025    CALCIUM 9.7 04/11/2025    PROTEINTOT 6.7 04/11/2025    ALBUMIN 3.6 04/11/2025    ALT 13 04/11/2025    ALT 13 04/11/2025    AST 18 04/11/2025    AST 17 04/11/2025    ALKPHOS 63 04/11/2025    BILITOT 1.0 04/11/2025    GLOB 3.1 04/11/2025    AGRATIO 1.2 04/11/2025    BCR 16.9 04/11/2025    ANIONGAP 10.0 04/11/2025    EGFR 75.1 04/11/2025     Occult fecal blood negative    Assessment/Plan:    This is an 80-year-old male with known medical diagnoses of diabetes mellitus type 2, hyperlipidemia, GERD, migraines, and left hip fracture with nailing (5/14/2023) presents emergency department via private vehicle for further evaluation of difficulty walking and difficulty with left hand coordination.  He reports that his symptoms were present upon awakening at 0900, however were not present when he got up to use the restroom at 0800.  Currently his NIHSS is 0.  I discussed the patient with Dr. Cooper and given his symptoms are improving it was elected to take him to MRI for hyperacute imaging prior to proceeding with IV thrombolytic therapy decision.  IV TNK checklist was completed and risk/benefits were discussed with the patient and his wife.  CTA head/neck/perfusion is negative for LVO.    Hyperacute MRI reveals DWI changes within the right parietal lobe with no FLAIR correlation therefore Dr. Cooper advises to offer the patient TNK.  I did discuss our recommendation with the patient and his wife once again and they have elected to proceed with administration; there was some delay in administration given patient decision making time.  He will be admitted to the neuro  ICU for post TNK monitoring and further stroke workup.    Antiplatelet PTA: None  Anticoagulant PTA: None        Acute right parietal stroke, etiology unknown at this time         Balance difficulty and left hand incoordination  - TIA/CVA order set with thrombolytic therapy has been initiated  - MRI brain, hyperacute, reveals DWI changes within the right parietal lobe with no FLAIR correlation based on this information patient is a candidate for IV thrombolytic therapy per the wake-up protocol.  - N.p.o. until bedside nursing dysphagia screen completed  - 24-hour post TNK CT tomorrow at 1340  -  mg daily if 24-hour CT is negative for hemorrhage  - Activity as tolerated, fall risk precautions  - PT/OT/SLP evaluation    Diabetes mellitus type 2  - A1c in a.m.   - Maintain euglycemia, goal blood glucose <140  - Management Per primary team    Hyperlipidemia  - Lipid panel in a.m.  - Continue atorvastatin 20 mg nightly, consider increasing to 40 mg if LDL is >70    Plan of care was discussed with patient, wife at bedside, Dr. Cooper (vascular neurology) and Dr. Devi (ED attending).  Stroke neurology will continue to follow.  Please call with any questions or concerns.    Merary Taylor, APRN  April 11, 2025  12:42 EDT

## 2025-04-12 ENCOUNTER — APPOINTMENT (OUTPATIENT)
Dept: CT IMAGING | Facility: HOSPITAL | Age: 80
DRG: 063 | End: 2025-04-12
Payer: MEDICARE

## 2025-04-12 ENCOUNTER — APPOINTMENT (OUTPATIENT)
Dept: CARDIOLOGY | Facility: HOSPITAL | Age: 80
DRG: 063 | End: 2025-04-12
Payer: MEDICARE

## 2025-04-12 LAB
ANION GAP SERPL CALCULATED.3IONS-SCNC: 8 MMOL/L (ref 5–15)
AORTIC DIMENSIONLESS INDEX: 0.81 (DI)
ASCENDING AORTA: 3.5 CM
AV MEAN PRESS GRAD SYS DOP V1V2: 2.5 MMHG
AV VMAX SYS DOP: 97.6 CM/SEC
BASOPHILS # BLD AUTO: 0.05 10*3/MM3 (ref 0–0.2)
BASOPHILS NFR BLD AUTO: 0.7 % (ref 0–1.5)
BH CV ECHO MEAS - AO MAX PG: 3.9 MMHG
BH CV ECHO MEAS - AO ROOT DIAM: 3.6 CM
BH CV ECHO MEAS - AO V2 VTI: 23.1 CM
BH CV ECHO MEAS - AVA(I,D): 2.5 CM2
BH CV ECHO MEAS - EDV(CUBED): 64 ML
BH CV ECHO MEAS - EDV(MOD-SP2): 95.5 ML
BH CV ECHO MEAS - EDV(MOD-SP4): 137 ML
BH CV ECHO MEAS - EF(MOD-SP2): 57 %
BH CV ECHO MEAS - EF(MOD-SP4): 55.3 %
BH CV ECHO MEAS - ESV(CUBED): 22 ML
BH CV ECHO MEAS - ESV(MOD-SP2): 41.1 ML
BH CV ECHO MEAS - ESV(MOD-SP4): 61.2 ML
BH CV ECHO MEAS - FS: 30 %
BH CV ECHO MEAS - IVS/LVPW: 1 CM
BH CV ECHO MEAS - IVSD: 1 CM
BH CV ECHO MEAS - LA DIMENSION: 3.9 CM
BH CV ECHO MEAS - LAT PEAK E' VEL: 6.5 CM/SEC
BH CV ECHO MEAS - LV DIASTOLIC VOL/BSA (35-75): 73.5 CM2
BH CV ECHO MEAS - LV MASS(C)D: 127.1 GRAMS
BH CV ECHO MEAS - LV MAX PG: 2.9 MMHG
BH CV ECHO MEAS - LV MEAN PG: 1 MMHG
BH CV ECHO MEAS - LV SYSTOLIC VOL/BSA (12-30): 32.8 CM2
BH CV ECHO MEAS - LV V1 MAX: 84.9 CM/SEC
BH CV ECHO MEAS - LV V1 VTI: 18.6 CM
BH CV ECHO MEAS - LVIDD: 4 CM
BH CV ECHO MEAS - LVIDS: 2.8 CM
BH CV ECHO MEAS - LVOT AREA: 3.1 CM2
BH CV ECHO MEAS - LVOT DIAM: 2 CM
BH CV ECHO MEAS - LVPWD: 1 CM
BH CV ECHO MEAS - MED PEAK E' VEL: 4.1 CM/SEC
BH CV ECHO MEAS - MV A MAX VEL: 64.4 CM/SEC
BH CV ECHO MEAS - MV DEC SLOPE: 459 CM/SEC2
BH CV ECHO MEAS - MV DEC TIME: 0.22 SEC
BH CV ECHO MEAS - MV E MAX VEL: 57.1 CM/SEC
BH CV ECHO MEAS - MV E/A: 0.89
BH CV ECHO MEAS - MV MAX PG: 3.1 MMHG
BH CV ECHO MEAS - MV MEAN PG: 2 MMHG
BH CV ECHO MEAS - MV P1/2T: 42.9 MSEC
BH CV ECHO MEAS - MV V2 VTI: 24 CM
BH CV ECHO MEAS - MVA(P1/2T): 5.1 CM2
BH CV ECHO MEAS - MVA(VTI): 2.43 CM2
BH CV ECHO MEAS - PA ACC TIME: 0.13 SEC
BH CV ECHO MEAS - PA V2 MAX: 94.6 CM/SEC
BH CV ECHO MEAS - RAP SYSTOLE: 8 MMHG
BH CV ECHO MEAS - RVSP: 26 MMHG
BH CV ECHO MEAS - SV(LVOT): 58.4 ML
BH CV ECHO MEAS - SV(MOD-SP2): 54.4 ML
BH CV ECHO MEAS - SV(MOD-SP4): 75.8 ML
BH CV ECHO MEAS - SVI(LVOT): 31.4 ML/M2
BH CV ECHO MEAS - SVI(MOD-SP2): 29.2 ML/M2
BH CV ECHO MEAS - SVI(MOD-SP4): 40.7 ML/M2
BH CV ECHO MEAS - TR MAX PG: 18 MMHG
BH CV ECHO MEAS - TR MAX VEL: 212 CM/SEC
BH CV ECHO MEASUREMENTS AVERAGE E/E' RATIO: 10.77
BH CV VAS BP LEFT ARM: NORMAL MMHG
BH CV XLRA - TDI S': 13.6 CM/SEC
BUN SERPL-MCNC: 16 MG/DL (ref 8–23)
BUN/CREAT SERPL: 23.2 (ref 7–25)
CALCIUM SPEC-SCNC: 9.5 MG/DL (ref 8.6–10.5)
CHLORIDE SERPL-SCNC: 104 MMOL/L (ref 98–107)
CHOLEST SERPL-MCNC: 104 MG/DL (ref 0–200)
CO2 SERPL-SCNC: 24 MMOL/L (ref 22–29)
CREAT SERPL-MCNC: 0.69 MG/DL (ref 0.76–1.27)
DEPRECATED RDW RBC AUTO: 40 FL (ref 37–54)
EGFRCR SERPLBLD CKD-EPI 2021: 92.4 ML/MIN/1.73
EOSINOPHIL # BLD AUTO: 0.27 10*3/MM3 (ref 0–0.4)
EOSINOPHIL NFR BLD AUTO: 3.6 % (ref 0.3–6.2)
ERYTHROCYTE [DISTWIDTH] IN BLOOD BY AUTOMATED COUNT: 12 % (ref 12.3–15.4)
GLUCOSE BLDC GLUCOMTR-MCNC: 132 MG/DL (ref 70–130)
GLUCOSE BLDC GLUCOMTR-MCNC: 161 MG/DL (ref 70–130)
GLUCOSE BLDC GLUCOMTR-MCNC: 184 MG/DL (ref 70–130)
GLUCOSE BLDC GLUCOMTR-MCNC: 195 MG/DL (ref 70–130)
GLUCOSE SERPL-MCNC: 147 MG/DL (ref 65–99)
HBA1C MFR BLD: 8 % (ref 4.8–5.6)
HCT VFR BLD AUTO: 40.6 % (ref 37.5–51)
HDLC SERPL-MCNC: 47 MG/DL (ref 40–60)
HGB BLD-MCNC: 13.4 G/DL (ref 13–17.7)
IMM GRANULOCYTES # BLD AUTO: 0.03 10*3/MM3 (ref 0–0.05)
IMM GRANULOCYTES NFR BLD AUTO: 0.4 % (ref 0–0.5)
IVRT: 144 MS
LDLC SERPL CALC-MCNC: 39 MG/DL (ref 0–100)
LDLC/HDLC SERPL: 0.82 {RATIO}
LV EF 2D ECHO EST: 60 %
LV EF BIPLANE MOD: 55.3 %
LYMPHOCYTES # BLD AUTO: 1.54 10*3/MM3 (ref 0.7–3.1)
LYMPHOCYTES NFR BLD AUTO: 20.5 % (ref 19.6–45.3)
MCH RBC QN AUTO: 29.7 PG (ref 26.6–33)
MCHC RBC AUTO-ENTMCNC: 33 G/DL (ref 31.5–35.7)
MCV RBC AUTO: 90 FL (ref 79–97)
MONOCYTES # BLD AUTO: 0.74 10*3/MM3 (ref 0.1–0.9)
MONOCYTES NFR BLD AUTO: 9.8 % (ref 5–12)
NEUTROPHILS NFR BLD AUTO: 4.89 10*3/MM3 (ref 1.7–7)
NEUTROPHILS NFR BLD AUTO: 65 % (ref 42.7–76)
NRBC BLD AUTO-RTO: 0 /100 WBC (ref 0–0.2)
PLATELET # BLD AUTO: 164 10*3/MM3 (ref 140–450)
PMV BLD AUTO: 12.1 FL (ref 6–12)
POTASSIUM SERPL-SCNC: 4.3 MMOL/L (ref 3.5–5.2)
RBC # BLD AUTO: 4.51 10*6/MM3 (ref 4.14–5.8)
SODIUM SERPL-SCNC: 136 MMOL/L (ref 136–145)
TRIGL SERPL-MCNC: 93 MG/DL (ref 0–150)
TSH SERPL DL<=0.05 MIU/L-ACNC: 0.97 UIU/ML (ref 0.27–4.2)
VLDLC SERPL-MCNC: 18 MG/DL (ref 5–40)
WBC NRBC COR # BLD AUTO: 7.52 10*3/MM3 (ref 3.4–10.8)

## 2025-04-12 PROCEDURE — 85025 COMPLETE CBC W/AUTO DIFF WBC: CPT | Performed by: INTERNAL MEDICINE

## 2025-04-12 PROCEDURE — 99233 SBSQ HOSP IP/OBS HIGH 50: CPT | Performed by: STUDENT IN AN ORGANIZED HEALTH CARE EDUCATION/TRAINING PROGRAM

## 2025-04-12 PROCEDURE — 80061 LIPID PANEL: CPT

## 2025-04-12 PROCEDURE — 97162 PT EVAL MOD COMPLEX 30 MIN: CPT

## 2025-04-12 PROCEDURE — 93306 TTE W/DOPPLER COMPLETE: CPT | Performed by: INTERNAL MEDICINE

## 2025-04-12 PROCEDURE — 99232 SBSQ HOSP IP/OBS MODERATE 35: CPT | Performed by: INTERNAL MEDICINE

## 2025-04-12 PROCEDURE — 97116 GAIT TRAINING THERAPY: CPT

## 2025-04-12 PROCEDURE — 63710000001 INSULIN LISPRO (HUMAN) PER 5 UNITS: Performed by: INTERNAL MEDICINE

## 2025-04-12 PROCEDURE — 84443 ASSAY THYROID STIM HORMONE: CPT | Performed by: INTERNAL MEDICINE

## 2025-04-12 PROCEDURE — 97535 SELF CARE MNGMENT TRAINING: CPT

## 2025-04-12 PROCEDURE — 80048 BASIC METABOLIC PNL TOTAL CA: CPT | Performed by: INTERNAL MEDICINE

## 2025-04-12 PROCEDURE — 82948 REAGENT STRIP/BLOOD GLUCOSE: CPT

## 2025-04-12 PROCEDURE — 92523 SPEECH SOUND LANG COMPREHEN: CPT

## 2025-04-12 PROCEDURE — 93306 TTE W/DOPPLER COMPLETE: CPT

## 2025-04-12 PROCEDURE — 70450 CT HEAD/BRAIN W/O DYE: CPT

## 2025-04-12 PROCEDURE — 97166 OT EVAL MOD COMPLEX 45 MIN: CPT

## 2025-04-12 PROCEDURE — 83036 HEMOGLOBIN GLYCOSYLATED A1C: CPT

## 2025-04-12 RX ADMIN — INSULIN LISPRO 2 UNITS: 100 INJECTION, SOLUTION INTRAVENOUS; SUBCUTANEOUS at 17:19

## 2025-04-12 RX ADMIN — ATORVASTATIN CALCIUM 20 MG: 20 TABLET, FILM COATED ORAL at 21:24

## 2025-04-12 RX ADMIN — MUPIROCIN 1 APPLICATION: 20 OINTMENT TOPICAL at 08:15

## 2025-04-12 RX ADMIN — ASPIRIN 325 MG: 325 TABLET ORAL at 16:06

## 2025-04-12 RX ADMIN — MUPIROCIN 1 APPLICATION: 20 OINTMENT TOPICAL at 21:24

## 2025-04-12 RX ADMIN — INSULIN LISPRO 2 UNITS: 100 INJECTION, SOLUTION INTRAVENOUS; SUBCUTANEOUS at 12:20

## 2025-04-12 RX ADMIN — INSULIN LISPRO 2 UNITS: 100 INJECTION, SOLUTION INTRAVENOUS; SUBCUTANEOUS at 21:24

## 2025-04-12 NOTE — PLAN OF CARE
Goal Outcome Evaluation:  Plan of Care Reviewed With: patient, spouse        Progress: no change  Outcome Evaluation: PT evaluation completed. Pt lives with wife and has full flight of stairs to enter home. Pt presents with decreased balance, strength, activity tolerance, and mobility below baseline, will benefit from IP PT services. Pt ambulated 50', then after seated rest and donning shoes, 50 additional feet with Tiera and intermittent support on telemetry monitor. Pt demo short shuffled steps, unsteady at times. Pt may benefit from use of FWW. Recommend DC to IRF.    Anticipated Discharge Disposition (PT): inpatient rehabilitation facility

## 2025-04-12 NOTE — PLAN OF CARE
Goal Outcome Evaluation:  Plan of Care Reviewed With: patient, spouse           Outcome Evaluation: OT initial eval and expanded chart review completed. Pt presents with multile comorbidities and decreased strength, balance, activity tolerance and coordination limiting independence with ADL's and mobility from baseline status. Recommend continued skilled OT services and transfer to IRF at d/c for best functional outcomes.    Anticipated Discharge Disposition (OT): inpatient rehabilitation facility

## 2025-04-12 NOTE — THERAPY EVALUATION
Acute Care - Speech Language Pathology Initial Evaluation  [facility]  Cognitive-Communication Evaluation       Patient Name: Jesse Chapin  : 1945  MRN: [id number]  Today's Date: 2025               Admit Date: 2025     Visit Dx:    ICD-10-CM ICD-9-CM   1. Acute left-sided weakness  R53.1 728.87   2. Slurred speech  R47.81 784.59   3. Ataxia  R27.0 781.3   4. History of hypertension  Z86.79 V12.59     Patient Active Problem List   Diagnosis    AIS s/p TNK    Dyslipidemia    T2DM (type 2 diabetes mellitus)    BPH (benign prostatic hyperplasia)    Vitamin D deficiency    Seasonal allergic rhinitis    Migraine     Past Medical History:   Diagnosis Date    BPH (benign prostatic hyperplasia) 2025    Dyslipidemia 2025    T2DM (type 2 diabetes mellitus) 2025     Past Surgical History:   Procedure Laterality Date    FRACTURE SURGERY      HERNIA REPAIR      JOINT REPLACEMENT      TONSILLECTOMY         SLP Recommendation and Plan                    Anticipated Discharge Disposition (SLP): home with OP services (25 1230)        Therapy Frequency (SLP SLC): evaluation only (25 1230)                                Progress:  (eval) (25 1446)      SLP EVALUATION (Last 72 Hours)       SLP SLC Evaluation       Row Name 25 1230                   Communication Assessment/Intervention    Document Type evaluation  -AV        Subjective Information no complaints  -AV        Patient Observations alert;cooperative  -AV        Patient/Family/Caregiver Comments/Observations wife present  -AV        Patient Effort good  -AV           General Information    Patient Profile Reviewed yes  -AV        Pertinent History Of Current Problem CVa, DM2  -AV        Precautions/Limitations, Vision WFL with corrective lenses  -AV        Precautions/Limitations, Hearing WFL;for purposes of eval  -AV        Prior Level of Function-Communication WFL  -AV        Plans/Goals Discussed with  patient;spouse/S.O.  -AV        Barriers to Rehab none identified  -AV        Patient's Goals for Discharge return to home  -AV        Family Goals for Discharge patient able to return to previous activities/roles  -AV           Pain    Pretreatment Pain Rating 0/10 - no pain  -AV        Posttreatment Pain Rating 0/10 - no pain  -AV           Comprehension Assessment/Intervention    Comprehension Assessment/Intervention Auditory Comprehension;Reading Comprehension  -AV           Auditory Comprehension Assessment/Intervention    Auditory Comprehension (Communication) WFL  -AV           Reading Comprehension Assessment/Intervention    Reading Comprehension (Communication) WFL  -AV           Expression Assessment/Intervention    Expression Assessment/Intervention verbal expression;graphic expression  -AV           Verbal Expression Assessment/Intervention    Verbal Expression WFL  -AV           Graphic Expression Assessment/Intervention    Graphic Expression WFL  -AV           Oral Motor Structure and Function    Oral Motor Structure and Function WFL  -AV        Dentition Assessment natural, present and adequate  -AV        Mucosal Quality moist, healthy  -AV           Oral Musculature and Cranial Nerve Assessment    Oral Motor General Assessment WFL  -AV           Motor Speech Assessment/Intervention    Motor Speech Function WFL  -AV           Cursory Voice Assessment/Intervention    Quality and Resonance (Voice) WFL  -AV           Cognitive Assessment Intervention- SLP    Cognitive Function (Cognition) WFL  -AV           Recommendations    Therapy Frequency (SLP SLC) evaluation only  -AV        Anticipated Discharge Disposition (SLP) home with OP services  -AV                  User Key  (r) = Recorded By, (t) = Taken By, (c) = Cosigned By      Initials Name Effective Dates    AV Shanell Zimmer MS CCC-SLP 06/16/21 -                        EDUCATION  The patient has been educated in the following areas:      Cognitive Impairment Communication Impairment.                        Time Calculation:      Time Calculation- SLP       Row Name 04/12/25 1446             Time Calculation- SLP    SLP Start Time 1230  -AV      SLP Received On 04/12/25  -AV         Untimed Charges    29156-ME Eval Speech and Production w/ Language Minutes 38  -AV         Total Minutes    Untimed Charges Total Minutes 38  -AV       Total Minutes 38  -AV                User Key  (r) = Recorded By, (t) = Taken By, (c) = Cosigned By      Initials Name Provider Type    AV Shanell Zimmer, MS CCC-SLP Speech and Language Pathologist                    Therapy Charges for Today       Code Description Service Date Service Provider Modifiers Qty    [id number] HC ST EVAL SPEECH AND PROD W LANG  3 4/12/2025 Shanell Zimmer, MS CCC-SLP GN 1                       Shanell Portillo MS CCC-SLP  4/12/2025

## 2025-04-12 NOTE — THERAPY EVALUATION
Patient Name: Jesse Chapin  : 1945    MRN: [id number]                              Today's Date: 2025       Admit Date: 2025    Visit Dx:     ICD-10-CM ICD-9-CM   1. Acute left-sided weakness  R53.1 728.87   2. Slurred speech  R47.81 784.59   3. Ataxia  R27.0 781.3   4. History of hypertension  Z86.79 V12.59     Patient Active Problem List   Diagnosis    AIS s/p TNK    Dyslipidemia    T2DM (type 2 diabetes mellitus)    BPH (benign prostatic hyperplasia)    Vitamin D deficiency    Seasonal allergic rhinitis    Migraine     Past Medical History:   Diagnosis Date    BPH (benign prostatic hyperplasia) 2025    Dyslipidemia 2025    T2DM (type 2 diabetes mellitus) 2025     Past Surgical History:   Procedure Laterality Date    FRACTURE SURGERY      HERNIA REPAIR      JOINT REPLACEMENT      TONSILLECTOMY        General Information       Row Name 25 0909          Physical Therapy Time and Intention    Document Type evaluation  -JEFF     Mode of Treatment physical therapy  -JEFF       Row Name 25 0909          General Information    Patient Profile Reviewed yes  -JEFF     Prior Level of Function independent:;community mobility;all household mobility;gait;ADL's;driving;using stairs  Uses cane with uneven ground  -JEFF     Existing Precautions/Restrictions fall  -JEFF     Barriers to Rehab medically complex  -JEFF       Row Name 25 0909          Living Environment    Current Living Arrangements home  -JEFF     People in Home spouse  -JEFF     Name(s) of People in Home Aria, wife  -JEFF       Row Name 25 0909          Home Main Entrance    Number of Stairs, Main Entrance other (see comments)  Flight from basement garage  -JEFF       Row Name 25 0909          Stairs Within Home, Primary    Stairs, Within Home, Primary flight from basement garage  -JEFF       Row Name 25 0909          Cognition    Orientation Status (Cognition) oriented x 3  slight word finding difficulty.  -JEFF        Row Name 04/12/25 0909          Safety Issues/Impairments Affecting Functional Mobility    Safety Issues Affecting Function (Mobility) ability to follow commands;awareness of need for assistance;safety precaution awareness;safety precautions follow-through/compliance;steps too close to assistive device  -JEFF     Impairments Affecting Function (Mobility) balance;cognition;coordination;endurance/activity tolerance;grasp;motor control;motor planning;visual/perceptual;strength;sensation/sensory awareness;postural/trunk control  -JEFF     Cognitive Impairments, Mobility Safety/Performance insight into deficits/self-awareness;awareness, need for assistance;safety precaution follow-through;problem-solving/reasoning  -JEFF               User Key  (r) = Recorded By, (t) = Taken By, (c) = Cosigned By      Initials Name Provider Type    Aury Valdez, PT Physical Therapist                   Mobility       Row Name 04/12/25 0916          Bed Mobility    Bed Mobility supine-sit;sit-supine;scooting/bridging  -JEFF     Scooting/Bridging [place] (Bed Mobility) standby assist  -JEFF     Supine-Sit [place] (Bed Mobility) standby assist  -JEFF     Sit-Supine [place] (Bed Mobility) standby assist  -JEFF     Assistive Device (Bed Mobility) bed rails;head of bed elevated  -JEFF     Comment, (Bed Mobility) Posterior lean once in sitting.  -JEFF       Row Name 04/12/25 0916          Transfers    Comment, (Transfers) Cues for safety  -JEFF       Row Name 04/12/25 0916          Sit-Stand Transfer    Sit-Stand [place] (Transfers) contact guard;verbal cues  -     Assistive Device (Sit-Stand Transfers) --  none  -JEFF     Comment, (Sit-Stand Transfer) no AD  -JEFF       Row Name 04/12/25 0916          Gait/Stairs (Locomotion)    [place] Level (Gait) minimum assist (75% patient effort);1 person assist  -JEFF     Assistive Device (Gait) other (see comments)  Intermittent support on telemetry monitor  -JEFF     Patient was able to  Ambulate yes  -JEFF     Distance in Feet (Gait) 100  -JEFF     Deviations/Abnormal Patterns (Gait) gait speed decreased  -JEFF     Left Sided Gait Deviations weight shift ability decreased  -JEFF     Comment, (Gait/Stairs) Pt ambulated 50', reported pain in R foot (which occurs at his baseline if he does not have shoe inserts), returned to EOB to put his shoes on. Walked additional 50' with shoes, pt did not complain of R foot pain but gait cont to be short shuffled steps, intermittent support on telemetry monitor. Pt might benefit from use of FWW next visit.  -JEFF               User Key  (r) = Recorded By, (t) = Taken By, (c) = Cosigned By      Initials Name Provider Type    Aury Valdez, PT Physical Therapist                   Obj/Interventions       Row Name 04/12/25 1011          Range of Motion Comprehensive    General Range of Motion bilateral lower extremity ROM WFL  -JEFF       Row Name 04/12/25 1011          Strength Comprehensive (MMT)    General Manual Muscle Testing (MMT) Assessment lower extremity strength deficits identified  -JEFF     Comment, General Manual Muscle Testing (MMT) Assessment B LEs grossly 4-/5, no significant assymetries  -JEFF       Row Name 04/12/25 1011          Balance    Balance Assessment sitting static balance;sitting dynamic balance;standing static balance;standing dynamic balance  -JEFF     Static Sitting Balance minimal assist  -JEFF     Dynamic Sitting Balance minimal assist  -JEFF     Position, Sitting Balance unsupported;sitting edge of bed  -JEFF     Static Standing Balance contact guard  -JEFF     Dynamic Standing Balance minimal assist  -JEFF     Position/Device Used, Standing Balance unsupported;supported;other (see comments)  Intermittent support on telemetry.  -JEFF     Balance Interventions sitting;standing;sit to stand  -JEFF     Comment, Balance Intermittent support on telemetry monitor. Posterior lean at first sitting EOB  -JEFF       Row Name 04/12/25 1011          Sensory Assessment  (Somatosensory)    Sensory Assessment (Somatosensory) other (see comments)  LE sensation not intact, pt has baseline neuropathy.  -JEFF               User Key  (r) = Recorded By, (t) = Taken By, (c) = Cosigned By      Initials Name Provider Type    Aury Valdez, PT Physical Therapist                   Goals/Plan       Row Name 04/12/25 1021          Bed Mobility Goal 1 (PT)    Activity/Assistive Device (Bed Mobility Goal 1, PT) sit to supine/supine to sit  -JEFF     [place] Level/Cues Needed (Bed Mobility Goal 1, PT) modified independence  -JEFF     Time Frame (Bed Mobility Goal 1, PT) long term goal (LTG);10 days  -JEFF       Row Name 04/12/25 1021          Transfer Goal 1 (PT)    Activity/Assistive Device (Transfer Goal 1, PT) sit-to-stand/stand-to-sit  -JEFF     [place] Level/Cues Needed (Transfer Goal 1, PT) modified independence  -JEFF     Time Frame (Transfer Goal 1, PT) short term goal (STG);5 days  -JEFF       Row Name 04/12/25 1021          Gait Training Goal 1 (PT)    Activity/Assistive Device (Gait Training Goal 1, PT) gait (walking locomotion);assistive device use  -JEFF     [place] Level (Gait Training Goal 1, PT) modified independence  -JEFF     Distance (Gait Training Goal 1, PT) 200  -JEFF     Time Frame (Gait Training Goal 1, PT) long term goal (LTG);10 days  -JEFF       Row Name 04/12/25 1021          Therapy Assessment/Plan (PT)    Planned Therapy Interventions (PT) balance training;bed mobility training;gait training;home exercise program;patient/family education;neuromuscular re-education;postural re-education;transfer training;strengthening;motor coordination training  -JEFF               User Key  (r) = Recorded By, (t) = Taken By, (c) = Cosigned By      Initials Name Provider Type    Aury Valdez, KATHERINE Physical Therapist                   Clinical Impression       Row Name 04/12/25 1014          Pain    Pretreatment Pain Rating 0/10 - no pain  -JEFF     Posttreatment Pain Rating 0/10 - no  pain  -JEFF       Row Name 04/12/25 1014          Plan of Care Review    Plan of Care Reviewed With patient;spouse  -JEFF     Progress no change  -JEFF     Outcome Evaluation PT evaluation completed. Pt lives with wife and has full flight of stairs to enter home. Pt presents with decreased balance, strength, activity tolerance, and mobility below baseline, will benefit from IP PT services. Pt ambulated 50', then after seated rest and donning shoes, 50 additional feet with Tiera and intermittent support on telemetry monitor. Pt demo short shuffled steps, unsteady at times. Pt may benefit from use of FWW. Recommend DC to IRF.  -JEFF       Row Name 04/12/25 1014          Therapy Assessment/Plan (PT)    Patient/Family Therapy Goals Statement (PT) return to PLOF  -JEFF     Rehab Potential (PT) good  -JEFF     Criteria for Skilled Interventions Met (PT) yes;skilled treatment is necessary  -JEFF     Therapy Frequency (PT) daily  -JEFF       Row Name 04/12/25 1014          Vital Signs    Pre Systolic BP Rehab 102  -JEFF     Pre Treatment Diastolic BP 82  -JEFF     Post Systolic BP Rehab 112  -JEFF     Post Treatment Diastolic BP 63  -JEFF     Pretreatment Heart Rate (beats/min) 75  -JEFF     Posttreatment Heart Rate (beats/min) 79  -JEFF     Pre SpO2 (%) 95  -JEFF     O2 Delivery Pre Treatment room air  -JEFF     O2 Delivery Intra Treatment room air  -JEFF     Post SpO2 (%) 96  -JEFF     O2 Delivery Post Treatment room air  -JEFF     Pre Patient Position Supine  -JEFF     Intra Patient Position Standing  -JEFF     Post Patient Position Supine  -JEFF       Row Name 04/12/25 1014          Positioning and Restraints    Pre-Treatment Position in bed  -JEFF     Post Treatment Position bed  -JEFF     In Bed notified nsg;supine;exit alarm on;with family/caregiver  -JEFF               User Key  (r) = Recorded By, (t) = Taken By, (c) = Cosigned By      Initials Name Provider Type    Aury Valdez, PT Physical Therapist                   Outcome Measures       Row Name 04/12/25  1025 04/12/25 0800       How much help from another person do you currently need...    Turning from your back to your side while in flat bed without using bedrails? 4  -JEFF 4  -MP    Moving from lying on back to sitting on the side of a flat bed without bedrails? 3  -JEFF 3  -MP    Moving to and from a bed to a chair (including a wheelchair)? 3  -JEFF 3  -MP    Standing up from a chair using your arms (e.g., wheelchair, bedside chair)? 3  -JEFF 3  -MP    Climbing 3-5 steps with a railing? 2  -JEFF 2  -MP    To walk in hospital room? 3  -JEFF 3  -MP    AM-PAC 6 Clicks Score (PT) 18  -JEFF 18  -MP    Highest Level of Mobility Goal 6 --> Walk 10 steps or more  -JEFF 6 --> Walk 10 steps or more  -MP      Row Name 04/12/25 1025 04/12/25 0915       Modified Clemons Scale    Pre-Stroke Modified [place] Scale 0 - No Symptoms at all.  -JEFF --    Modified [place] Scale 3 - Moderate disability.  Requiring some help, but able to walk without assistance.  -JEFF 3 - Moderate disability.  Requiring some help, but able to walk without assistance.  -      Row Name 04/12/25 1025 04/12/25 0915       Functional Assessment    Outcome Measure Options AM-PAC 6 Clicks Basic Mobility (PT);Modified [place]  -JEFF AM-PAC 6 Clicks Daily Activity (OT);Modified [place]  -              User Key  (r) = Recorded By, (t) = Taken By, (c) = Cosigned By      Initials Name Provider Type    Angela Fatima OT Occupational Therapist    Aury Valdez, PT Physical Therapist    Carmen Meng, RN Registered Nurse                                 Physical Therapy Education       Title: PT OT SLP Therapies (In Progress)       Topic: Physical Therapy (In Progress)       Point: Mobility training (Done)       Learning Progress Summary            Patient Acceptance, E, VU,NR by JEFF at 4/12/2025 1025   Significant Other Acceptance, E, VU,NR by JEFF at 4/12/2025 1025                      Point: Home exercise program (Not Started)       Learner Progress:  Not documented in  this visit.              Point: Body mechanics (Done)       Learning Progress Summary            Patient Acceptance, E, VU,NR by  at 4/12/2025 1025   Significant Other Acceptance, E, VU,NR by JEFF at 4/12/2025 1025                      Point: Precautions (Done)       Learning Progress Summary            Patient Acceptance, E, VU,NR by JEFF at 4/12/2025 1025   Significant Other Acceptance, E, VU,NR by JEFF at 4/12/2025 1025                                      User Key       Initials Effective Dates Name Provider Type Discipline     06/16/21 -  Aury Oro, PT Physical Therapist PT                  PT Recommendation and Plan  Planned Therapy Interventions (PT): balance training, bed mobility training, gait training, home exercise program, patient/family education, neuromuscular re-education, postural re-education, transfer training, strengthening, motor coordination training  Progress: no change  Outcome Evaluation: PT evaluation completed. Pt lives with wife and has full flight of stairs to enter home. Pt presents with decreased balance, strength, activity tolerance, and mobility below baseline, will benefit from IP PT services. Pt ambulated 50', then after seated rest and donning shoes, 50 additional feet with Tiera and intermittent support on telemetry monitor. Pt demo short shuffled steps, unsteady at times. Pt may benefit from use of FWW. Recommend DC to IRF.     Time Calculation:   PT Evaluation Complexity  History, PT Evaluation Complexity: 1-2 personal factors and/or comorbidities  Examination of Body Systems (PT Eval Complexity): total of 3 or more elements  Clinical Presentation (PT Evaluation Complexity): evolving  Clinical Decision Making (PT Evaluation Complexity): moderate complexity  Overall Complexity (PT Evaluation Complexity): moderate complexity     PT Charges       Row Name 04/12/25 1026             Time Calculation    Start Time 0820  -JEFF      PT Received On 04/12/25  -JEFF      PT Goal Re-Cert  Due Date 04/22/25  -JEFF         Time Calculation- PT    Total Timed Code Minutes- PT 13 minute(s)  -JEFF         Timed Charges    13194 - Gait Training Minutes  13  -JEFF         Untimed Charges    PT Eval/Re-eval Minutes 53  -JEFF         Total Minutes    Timed Charges Total Minutes 13  -JEFF      Untimed Charges Total Minutes 53  -JEFF       Total Minutes 66  -JEFF                User Key  (r) = Recorded By, (t) = Taken By, (c) = Cosigned By      Initials Name Provider Type    Aury Valdez, PT Physical Therapist                  Therapy Charges for Today       Code Description Service Date Service Provider Modifiers Qty    85798645177 HC GAIT TRAINING EA 15 MIN 4/12/2025 Aury Oro, PT GP 1    41135178323 HC PT EVAL MOD COMPLEXITY 4 4/12/2025 Aury Oro, PT GP 1            PT G-Codes  Outcome Measure Options: AM-PAC 6 Clicks Basic Mobility (PT), Modified [place]  AM-PAC 6 Clicks Score (PT): 18  AM-PAC 6 Clicks Score (OT): 16  Modified [place] Scale: 3 - Moderate disability.  Requiring some help, but able to walk without assistance.  PT Discharge Summary  Anticipated Discharge Disposition (PT): inpatient rehabilitation facility    Aury Oro PT  4/12/2025

## 2025-04-12 NOTE — THERAPY EVALUATION
Patient Name: Jesse Chapin  : 1945    MRN: [id number]                              Today's Date: 2025       Admit Date: 2025    Visit Dx:     ICD-10-CM ICD-9-CM   1. Acute left-sided weakness  R53.1 728.87   2. Slurred speech  R47.81 784.59   3. Ataxia  R27.0 781.3   4. History of hypertension  Z86.79 V12.59     Patient Active Problem List   Diagnosis    AIS s/p TNK    Dyslipidemia    T2DM (type 2 diabetes mellitus)    BPH (benign prostatic hyperplasia)    Vitamin D deficiency    Seasonal allergic rhinitis    Migraine     Past Medical History:   Diagnosis Date    BPH (benign prostatic hyperplasia) 2025    Dyslipidemia 2025    T2DM (type 2 diabetes mellitus) 2025     Past Surgical History:   Procedure Laterality Date    FRACTURE SURGERY      HERNIA REPAIR      JOINT REPLACEMENT      TONSILLECTOMY        General Information       Row Name 25 0845          OT Time and Intention    Document Type evaluation  -     Mode of Treatment occupational therapy  -       Row Name 25 0845          General Information    Patient Profile Reviewed yes  -JR     Prior Level of Function independent:;gait;transfer;bed mobility;ADL's;driving;using stairs  Pt reports he has a cane he uses with uneven ground, otherwise independent with ADL's and mobility prior.  -JR     Existing Precautions/Restrictions fall  -JR     Barriers to Rehab medically complex  -       Row Name 25 0845          Occupational Profile    Occupational History/Life Experiences (Occupational Profile) Pt reports he has a straight cane and shower chair from previous hip fracture & repair, also reports grab bars throughout the bathroom  -       Row Name 25 0845          Living Environment    Current Living Arrangements home  -JR     People in Home spouse  -JR       Row Name 25 0845          Home Main Entrance    Number of Stairs, Main Entrance other (see comments)  flight of steps from garage  -JR        Row Name 04/12/25 0845          Stairs Within Home, Primary    Number of Stairs, Within Home, Primary none  -       Row Name 04/12/25 0845          Cognition    Orientation Status (Cognition) oriented x 3  slight word finding difficulty noted with orientation questions  -       Row Name 04/12/25 0845          Safety Issues/Impairments Affecting Functional Mobility    Safety Issues Affecting Function (Mobility) awareness of need for assistance;insight into deficits/self-awareness;judgment;problem-solving;safety precaution awareness;safety precautions follow-through/compliance  -     Impairments Affecting Function (Mobility) balance;cognition;coordination;endurance/activity tolerance;grasp;motor control;motor planning;visual/perceptual;strength;sensation/sensory awareness;postural/trunk control  -     Cognitive Impairments, Mobility Safety/Performance awareness, need for assistance;insight into deficits/self-awareness;judgment;problem-solving/reasoning;safety precaution awareness;safety precaution follow-through;sequencing abilities  -               User Key  (r) = Recorded By, (t) = Taken By, (c) = Cosigned By      Initials Name Provider Type     Angela Garcia OT Occupational Therapist                     Mobility/ADL's       Row Name 04/12/25 0848          Bed Mobility    Bed Mobility supine-sit;sit-supine  -     Supine-Sit [place] (Bed Mobility) standby assist  -     Sit-Supine [place] (Bed Mobility) standby assist  -     Bed Mobility, Safety Issues decreased use of arms for pushing/pulling;decreased use of legs for bridging/pushing;impaired trunk control for bed mobility  -     Assistive Device (Bed Mobility) bed rails;head of bed elevated  -     Comment, (Bed Mobility) Pt required increased time for supine to sit on EOB, and posterior lean noted sitting EOB  -       Row Name 04/12/25 0848          Transfers    Transfers sit-stand transfer  -[facility] Name 04/12/25  0848          Sit-Stand Transfer    Sit-Stand [place] (Transfers) contact guard;verbal cues  -     Assistive Device (Sit-Stand Transfers) other (see comments)  none  -       Row Name 04/12/25 0848          Functional Mobility    Functional Mobility- Ind. Level minimum assist (75% patient effort);verbal cues required  -     Functional Mobility- Device other (see comments)  Initially without AD, progressed to holding tele monitor  -     Functional Mobility-Distance (Feet) --  household distance  -     Functional Mobility- Safety Issues balance decreased during turns;sequencing ability decreased;step length decreased;weight-shifting ability decreased  -       Row Name 04/12/25 0848          Activities of Daily Living    BADL Assessment/Intervention upper body dressing;lower body dressing  -[facility] Name 04/12/25 0848          Upper Body Dressing Assessment/Training    [place] Level (Upper Body Dressing) don;front opening garment;minimum assist (75% patient effort);verbal cues  -     Position (Upper Body Dressing) edge of bed sitting  -[facility] Name 04/12/25 0848          Lower Body Dressing Assessment/Training    [place] Level (Lower Body Dressing) don;socks;shoes/slippers;set up;verbal cues  -     Position (Lower Body Dressing) edge of bed sitting  -               User Key  (r) = Recorded By, (t) = Taken By, (c) = Cosigned By      Initials Name Provider Type    Angela Fatima, OT Occupational Therapist                   Obj/Interventions       Row Name 04/12/25 0850          Sensory Assessment (Somatosensory)    Sensory Assessment (Somatosensory) UE sensation intact  -[facility] Name 04/12/25 0850          Vision Assessment/Intervention    Visual Impairment/Limitations corrective lenses full-time  -     Vision Assessment Comment Pt with difficulty tracking to the L this date, slight decreased peripheral vision on L, pt able ID # of fingers in both visual fields this  date  -JR       Row Name 04/12/25 0850          Range of Motion Comprehensive    General Range of Motion bilateral upper extremity ROM WFL  -JR       Row Name 04/12/25 0850          Strength Comprehensive (MMT)    Comment, General Manual Muscle Testing (MMT) Assessment R UE functionally 4+/5, L UE functionally 3+/5  -       Row Name 04/12/25 0850          Motor Skills    Motor Skills coordination;muscle tone  -JR     Coordination fine motor deficit;gross motor deficit;left;upper extremity;moderate impairment;finger to nose;other (see comments)  opposition  -JR     Muscle Tone WNL  -JR       Row Name 04/12/25 0850          Balance    Balance Assessment sitting static balance;sitting dynamic balance;standing dynamic balance  -JR     Static Sitting Balance minimal assist  -JR     Dynamic Sitting Balance minimal assist  -JR     Dynamic Standing Balance minimal assist  -JR     Position/Device Used, Standing Balance supported  -JR               User Key  (r) = Recorded By, (t) = Taken By, (c) = Cosigned By      Initials Name Provider Type    JR Angela Garcia, OT Occupational Therapist                   Goals/Plan       [facility] Name 04/12/25 0903          Transfer Goal 1 (OT)    Activity/Assistive Device (Transfer Goal 1, OT) transfers, all  -JR     [place] Level/Cues Needed (Transfer Goal 1, OT) standby assist  -JR     Time Frame (Transfer Goal 1, OT) short term goal (STG);5 days  -JR     Progress/Outcome (Transfer Goal 1, OT) new goal  -       Row Name 04/12/25 0903          Strength Goal 1 (OT)    Strength Goal 1 (OT) Pt to increase L UE strength by 1/2 muscle grade to support ADL independence.  -JR     Time Frame (Strength Goal 1, OT) long term goal (LTG);by discharge  -JR     Progress/Outcome (Strength Goal 1, OT) new goal  -       Row Name 04/12/25 0903          Problem Specific Goal 1 (OT)    Problem Specific Goal 1 (OT) Pt to demo independence with L UE [facility] HEP to support ADL independence.  -JR      Time Frame (Problem Specific Goal 1, OT) long term goal (LTG);by discharge  -JR     Progress/Outcome (Problem Specific Goal 1, OT) new goal  -       Row Name 04/12/25 0903          Therapy Assessment/Plan (OT)    Planned Therapy Interventions (OT) adaptive equipment training;BADL retraining;cognitive/visual perception retraining;functional balance retraining;occupation/activity based interventions;ROM/therapeutic exercise;transfer/mobility retraining;strengthening exercise;passive ROM/stretching;patient/caregiver education/training;neuromuscular control/coordination retraining;activity tolerance training  -JR               User Key  (r) = Recorded By, (t) = Taken By, (c) = Cosigned By      Initials Name Provider Type    JR Angela Garcia, OT Occupational Therapist                   Clinical Impression       Row Name 04/12/25 0816          Pain Assessment    Pretreatment Pain Rating 0/10 - no pain  -JR     Posttreatment Pain Rating 0/10 - no pain  -       Row Name 04/12/25 0855          Plan of Care Review    Plan of Care Reviewed With patient;spouse  -JR     Outcome Evaluation OT initial eval and expanded chart review completed. Pt presents with multile comorbidities and decreased strength, balance, activity tolerance and coordination limiting independence with ADL's and mobility from baseline status. Recommend continued skilled OT services and transfer to IRF at d/c for best functional outcomes.  -       Row Name 04/12/25 0854          Therapy Assessment/Plan (OT)    Patient/Family Therapy Goal Statement (OT) get better  -JR     Rehab Potential (OT) good  -     Criteria for Skilled Therapeutic Interventions Met (OT) yes;skilled treatment is necessary;meets criteria  -JR     Therapy Frequency (OT) daily  -JR     Predicted Duration of Therapy Intervention (OT) 10 days  -JR       Row Name 04/12/25 0823          Therapy Plan Review/Discharge Plan (OT)    Anticipated Discharge Disposition (OT) inpatient  rehabilitation facility  -       Row Name 04/12/25 0855          Vital Signs    Pre Systolic BP Rehab 102  -JR     Pre Treatment Diastolic BP 82  -JR     Post Systolic BP Rehab 112  -JR     Post Treatment Diastolic BP 63  -JR     Pretreatment Heart Rate (beats/min) 80  -JR     Posttreatment Heart Rate (beats/min) 80  -JR     Pre SpO2 (%) 96  -JR     O2 Delivery Pre Treatment room air  -JR     Post SpO2 (%) 95  -JR     O2 Delivery Post Treatment room air  -JR     Pre Patient Position Supine  -JR     Intra Patient Position Standing  -JR     Post Patient Position Supine  -JR       Row Name 04/12/25 0855          Positioning and Restraints    Pre-Treatment Position in bed  -JR     Post Treatment Position bed  -JR     In Bed notified nsg;supine;call light within reach;encouraged to call for assist;exit alarm on;with family/caregiver  -JR               User Key  (r) = Recorded By, (t) = Taken By, (c) = Cosigned By      Initials Name Provider Type    JR Angela Garcia, OT Occupational Therapist                   Outcome Measures       Row Name 04/12/25 0915          How much help from another is currently needed...    Putting on and taking off regular lower body clothing? 3  -JR     Bathing (including washing, rinsing, and drying) 2  -JR     Toileting (which includes using toilet bed pan or urinal) 2  -JR     Putting on and taking off regular upper body clothing 3  -JR     Taking care of personal grooming (such as brushing teeth) 3  -JR     Eating meals 3  -JR     AM-PAC 6 Clicks Score (OT) 16  -JR       Row Name 04/12/25 0800          How much help from another person do you currently need...    Turning from your back to your side while in flat bed without using bedrails? 4  -MP     Moving from lying on back to sitting on the side of a flat bed without bedrails? 3  -MP     Moving to and from a bed to a chair (including a wheelchair)? 3  -MP     Standing up from a chair using your arms (e.g., wheelchair, bedside  chair)? 3  -MP     Climbing 3-5 steps with a railing? 2  -MP     To walk in hospital room? 3  -MP     AM-PAC 6 Clicks Score (PT) 18  -MP       Row Name 04/12/25 0915          Modified [place] Scale    Modified [place] Scale 3 - Moderate disability.  Requiring some help, but able to walk without assistance.  -       Row Name 04/12/25 0915          Functional Assessment    Outcome Measure Options AM-PAC 6 Clicks Daily Activity (OT);Modified [place]  -               User Key  (r) = Recorded By, (t) = Taken By, (c) = Cosigned By      Initials Name Provider Type     Angela Garcia, OT Occupational Therapist    Carmen Meng, RN Registered Nurse                    Occupational Therapy Education       Title: PT OT SLP Therapies (In Progress)       Topic: Occupational Therapy (In Progress)       Point: ADL training (Done)       Learning Progress Summary            Patient Acceptance, E, VU,NR by  at 4/12/2025 0807    Comment: role of therapy, ongoing treatment plan, enocuraged call bell use                                      User Key       Initials Effective Dates Name Provider Type [facility] 02/03/23 -  Angela Garcia, OT Occupational Therapist OT                  OT Recommendation and Plan  Planned Therapy Interventions (OT): adaptive equipment training, BADL retraining, cognitive/visual perception retraining, functional balance retraining, occupation/activity based interventions, ROM/therapeutic exercise, transfer/mobility retraining, strengthening exercise, passive ROM/stretching, patient/caregiver education/training, neuromuscular control/coordination retraining, activity tolerance training  Therapy Frequency (OT): daily  Plan of Care Review  Plan of Care Reviewed With: patient, spouse  Outcome Evaluation: OT initial eval and expanded chart review completed. Pt presents with multile comorbidities and decreased strength, balance, activity tolerance and coordination limiting independence with  ADL's and mobility from baseline status. Recommend continued skilled OT services and transfer to IRF at d/c for best functional outcomes.     Time Calculation:   Evaluation Complexity (OT)  Review Occupational Profile/Medical/Therapy History Complexity: expanded/moderate complexity  Assessment, Occupational Performance/Identification of Deficit Complexity: 3-5 performance deficits  Clinical Decision Making Complexity (OT): detailed assessment/moderate complexity  Overall Complexity of Evaluation (OT): moderate complexity     Time Calculation- OT       Row Name 04/12/25 0919             Time Calculation- OT    OT Start Time 0807  -JR      OT Received On 04/12/25  -JR      OT Goal Re-Cert Due Date 04/22/25  -JR         Timed Charges    87164 - OT Self Care/Mgmt Minutes 10  -JR         Untimed Charges    OT Eval/Re-eval Minutes 46  -JR         Total Minutes    Timed Charges Total Minutes 10  -JR      Untimed Charges Total Minutes 46  -JR       Total Minutes 56  -JR                User Key  (r) = Recorded By, (t) = Taken By, (c) = Cosigned By      Initials Name Provider Type    JR Angela Garcia, OT Occupational Therapist                  Therapy Charges for Today       Code Description Service Date Service Provider Modifiers Qty    93017170768  OT SELF CARE/MGMT/TRAIN EA 15 MIN 4/12/2025 Angela Garcia OT GO 1    [id number] HC OT EVAL MOD COMPLEXITY 4 4/12/2025 Angela Garcia OT GO 1                 Angela Garcia, OT  4/12/2025

## 2025-04-12 NOTE — PROGRESS NOTES
[facility]   Stroke Neurology Note     Patient Name: Jesse Chapin  : 1945  MRN: [id number]  Primary Care Physician: Andree Burroughs MD  Location of Neurologist: [place]     Subjective     History        I saw and examined the patient at bedside.  He reports his exam has returned to his baseline.  Denies headaches or new neurologic symptoms overnight.  24-hour CT head reviewed and unremarkable.  Patient cleared to start aspirin.     Stroke Risk Factors/ Pertinent Data      Stroke risk factors: HLD, DMT2  Anticoagulants prior to arrival: none  Antiplatelets prior to arrival: none        Scoring Scales      Pre-Stroke Modified [place] Scale: 0 - No Symptoms at all.    NIH Stroke Scale      Interval: baseline  1a. Level of Consciousness: 0-->Alert, keenly responsive  1b. LOC Questions: 0-->Answers both questions correctly  1c. LOC Commands: 0-->Performs both tasks correctly  2. Best Gaze: 0-->Normal  3. Visual: 0-->No visual loss  4. Facial Palsy: 0-->Normal symmetrical movements  5a. Motor Arm, Left: 0-->No drift, limb holds 90 (or 45) degrees for full 10 secs  5b. Motor Arm, Right: 0-->No drift, limb holds 90 (or 45) degrees for full 10 secs  6a. Motor Leg, Left: 0-->No drift, leg holds 30 degree position for full 5 secs  6b. Motor Leg, Right: 0-->No drift, leg holds 30 degree position for full 5 secs  7. Limb Ataxia: 0-->Absent  8. Sensory: 0-->Normal, no sensory loss  9. Best Language: 0-->No aphasia, normal  10. Dysarthria: 0-->Normal  11. Extinction and Inattention (formerly Neglect): 0-->No abnormality    Total (NIH Stroke Scale): 0         Review of Systems      Review of Systems  Negative except as above    Objective  Exam     Neurological Exam  NIHSS as above.      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  24-hour CT head reviewed and unremarkable.  Etiology unclear, cannot rule out cardioembolic given cortical appearance.     Plan:  -Start aspirin 325 mg daily  -Continue atorvastatin 20 mg daily LDL 39  -PT OT evaluated the patient and recommend IRF  -Tomorrow, recommend decrease aspirin to 81 mg daily and add clopidogrel 75 mg daily  - BP parameters normotensive  -Recommend Holter monitor prior to discharge  - Follow-up in stroke neurology clinic in 1 month after discharge     Disposition      Disposition: anticipate discharge to IRF tomorrow    Mylene Cooper MD   04/12/25  14:38 EDT  [facility] STROKE NEURO

## 2025-04-12 NOTE — PLAN OF CARE
Goal Outcome Evaluation:  Plan of Care Reviewed With: patient, spouse        Progress:  (eval)       Anticipated Discharge Disposition (SLP): home with OP services

## 2025-04-12 NOTE — PROGRESS NOTES
"INTENSIVIST NOTE     Hospital Day: 1    Mr. Jesse Chapin, 80 y.o. male is followed for:   Acute CVA       SUBJECTIVE     Interval history:    NIH remains 0.  He feels he is back to his baseline neurologically.  On room air.  Blood pressure within parameters.  Awake alert and oriented.  Wife at bedside.    The patient's relevant past medical, surgical and social history were reviewed and updated in Epic as appropriate.       OBJECTIVE     Vital Sign Min/Max for last 24 hours  Temp  Min: 97.3 °F (36.3 °C)  Max: 99.1 °F (37.3 °C)   BP  Min: 79/43  Max: 131/53   Pulse  Min: 58  Max: 78   Resp  Min: 14  Max: 18   SpO2  Min: 90 %  Max: 99 %   No data recorded   Weight  Min: 74.8 kg (165 lb)  Max: 74.8 kg (165 lb)      Intake/Output Summary (Last 24 hours) at 4/12/2025 1354  Last data filed at 4/12/2025 0900  Gross per 24 hour   Intake 240 ml   Output 2450 ml   Net -2210 ml      Flowsheet Rows      Flowsheet Row First Filed Value   Admission Height 170.2 cm (67\") Documented at 04/11/2025 1222   Admission Weight 74.8 kg (165 lb) Documented at 04/11/2025 1222               04/11/25  1222 04/12/25  1115   Weight: 74.8 kg (165 lb) 74.8 kg (165 lb)            Objective:  General Appearance:  In no acute distress.    Vital signs: (most recent): Blood pressure 104/53, pulse 75, temperature 99.1 °F (37.3 °C), temperature source Oral, resp. rate 16, height 170.2 cm (67.01\"), weight 74.8 kg (165 lb), SpO2 95%.    HEENT: Normal HEENT exam.    Lungs:  Normal effort and normal respiratory rate.  Breath sounds clear to auscultation.  He is not in respiratory distress.  No rales, wheezes or rhonchi.    Heart: Normal rate.  Regular rhythm.  S1 normal and S2 normal.  No murmur, gallop or friction rub.   Chest: Symmetric chest wall expansion.   Abdomen: Abdomen is soft and non-distended.  Bowel sounds are normal.   There is no abdominal tenderness.   There is no mass. There is no splenomegaly. There is no hepatomegaly.   Extremities: " There is no deformity or dependent edema.    Neurological: Patient is alert and oriented to person, place and time.    Pupils:  Pupils are equal, round, and reactive to light.    Skin:  Warm and dry.                I reviewed the patient's new clinical results.  I reviewed the patient's new imaging results/reports including actual images and agree with reports.    XR Chest 1 View  Result Date: 4/11/2025  Impression: No active cardiopulmonary disease Electronically Signed: Germán Stokes  4/11/2025 2:06 PM EDT  Workstation ID: [id number]    MRI Brain WO CON Hyper Acute Stroke Protocol  Result Date: 4/11/2025  Impression: Small cortical infarcts seen along the right frontal and parietal lobes. There is no associated FLAIR signal abnormality at these areas suggesting early/hyperacute infarct. Findings of infarct discussed with NP KASHIF HERNANDEZ by Dr. Drew Browning via telephone on 4/11/2025 1:29 PM EDT. Electronically Signed: Drew Browning MD  4/11/2025 1:33 PM EDT  Workstation ID: [id number]    CT Angiogram Head w AI Analysis of LVO  Result Date: 4/11/2025  1.No hemodynamically significant stenosis, large vessel cut off or aneurysms in the intracranial circulation 2.Mild to moderate carotid atherosclerotic disease, right greater than left, with approximately 50% stenosis involving the proximal right ICA. Electronically Signed: Warren Beasley MD  4/11/2025 1:05 PM EDT  Workstation ID: [id number]    CT Angiogram Neck  Result Date: 4/11/2025  1.No hemodynamically significant stenosis, large vessel cut off or aneurysms in the intracranial circulation 2.Mild to moderate carotid atherosclerotic disease, right greater than left, with approximately 50% stenosis involving the proximal right ICA. Electronically Signed: Warren Beasley MD  4/11/2025 1:05 PM EDT  Workstation ID: [id number]    CT CEREBRAL PERFUSION WITH & WITHOUT CONTRAST  Result Date: 4/11/2025   1. No evidence of core infarct nor ischemic brain at risk.  Electronically Signed: Warren Beasley MD  4/11/2025 1:02 PM EDT  Workstation ID: [id number]    CT Head Without Contrast Stroke Protocol  Result Date: 4/11/2025  Impression: 1.No evidence of acute intracranial hemorrhage or large territory infarct. 2.Age-indeterminate but possibly old small infarct in the right frontal lobe. 3.Chronic changes as above. Electronically Signed: Drew Browning MD  4/11/2025 12:42 PM EDT  Workstation ID: [id number]       INFUSIONS       Results from last 7 days   Lab Units 04/12/25  0452 04/11/25  1240 04/11/25  1236   WBC 10*3/mm3 7.52  --  5.82   HEMOGLOBIN g/dL 13.4  --  13.5   HEMOGLOBIN, POC g/dL  --  13.9  --    HEMATOCRIT % 40.6  --  42.2   HEMATOCRIT POC %  --  41  --    PLATELETS 10*3/mm3 164  --  152     Results from last 7 days   Lab Units 04/12/25  0452 04/11/25  1240 04/11/25  1236   SODIUM mmol/L 136  --  137   POTASSIUM mmol/L 4.3  --  4.5   CHLORIDE mmol/L 104  --  102   CO2 mmol/L 24.0  --  25.0   BUN mg/dL 16  --  15   GLUCOSE mg/dL 147*  --  251*   CREATININE mg/dL 0.69* 1.00 0.89   CALCIUM mg/dL 9.5  --  9.7   ALBUMIN g/dL  --   --  3.6               Patient isn't on Tube Feeding   /h  Patient doesn't have any tube feeding modular orders    Mechanical Ventilator:   Settings: Observed:                                                I reviewed the patient's medications.    Assessment & Plan   ASSESSMENT/PLAN     Active Hospital Problems    Diagnosis     **AIS s/p TNK     Dyslipidemia     T2DM (type 2 diabetes mellitus)        80-year-old male with a past medical history significant for type 2 diabetes mellitus, dyslipidemia, and migraine headaches.  He presented to the emergency department on 4/11 complaining of difficulty walking, dysarthria, and left hand incoordination. He noticed this when he woke at 0900. He had previously been awake briefly at 0800 and noted no difficulty. His wife was at the store and when she returned she brought him to the ER. His symptoms had  improved somewhat prior to evaluation here. A hyperacute MRI revealed small cortical infarct seen along the right frontal and parietal lobes. There was associated FLAIR signal abnormalities in these areas suggesting an acute infarct. Routine CT imaging and perfusion without significant abnormalities. There was some mild to moderate atherosclerotic disease noted on angiogram.  He received TNK at 1354 on that date.    Today his NIH remains 0.  He thinks that neurologically he is probably at baseline.  Plans are for a follow-up head CT later this afternoon.    Plan:    Head CT later this afternoon  High-dose statin  Sliding-scale insulin  Further recommendations regarding treatment of his stroke per neurology     I discussed the patient's findings and my recommendations with patient, family, and nursing staff     Plan of care and goals reviewed with multidisciplinary team at daily rounds.    Any copied data from previous notes included in the (1) History of Present Illness, (2) Physical Examination and (3) Medical Decision Making and/or Assessment and Plan has been reviewed and is accurate as of 04/12/25    .    Ruben Hawkins MD  Pulmonary and Critical Care Medicine  04/12/25 13:54 EDT

## 2025-04-13 LAB
GLUCOSE BLDC GLUCOMTR-MCNC: 148 MG/DL (ref 70–130)
GLUCOSE BLDC GLUCOMTR-MCNC: 152 MG/DL (ref 70–130)
GLUCOSE BLDC GLUCOMTR-MCNC: 231 MG/DL (ref 70–130)
GLUCOSE BLDC GLUCOMTR-MCNC: 237 MG/DL (ref 70–130)

## 2025-04-13 PROCEDURE — 63710000001 INSULIN LISPRO (HUMAN) PER 5 UNITS: Performed by: INTERNAL MEDICINE

## 2025-04-13 PROCEDURE — 99232 SBSQ HOSP IP/OBS MODERATE 35: CPT | Performed by: INTERNAL MEDICINE

## 2025-04-13 PROCEDURE — 99233 SBSQ HOSP IP/OBS HIGH 50: CPT | Performed by: STUDENT IN AN ORGANIZED HEALTH CARE EDUCATION/TRAINING PROGRAM

## 2025-04-13 PROCEDURE — 82948 REAGENT STRIP/BLOOD GLUCOSE: CPT

## 2025-04-13 RX ORDER — CLOPIDOGREL BISULFATE 75 MG/1
75 TABLET ORAL DAILY
Status: DISCONTINUED | OUTPATIENT
Start: 2025-04-13 | End: 2025-04-14 | Stop reason: HOSPADM

## 2025-04-13 RX ORDER — ASPIRIN 81 MG/1
81 TABLET, CHEWABLE ORAL DAILY
Status: DISCONTINUED | OUTPATIENT
Start: 2025-04-13 | End: 2025-04-14 | Stop reason: HOSPADM

## 2025-04-13 RX ADMIN — CLOPIDOGREL BISULFATE 75 MG: 75 TABLET, FILM COATED ORAL at 09:07

## 2025-04-13 RX ADMIN — MUPIROCIN 1 APPLICATION: 20 OINTMENT TOPICAL at 09:07

## 2025-04-13 RX ADMIN — MUPIROCIN 1 APPLICATION: 20 OINTMENT TOPICAL at 20:54

## 2025-04-13 RX ADMIN — ASPIRIN 81 MG: 81 TABLET, CHEWABLE ORAL at 09:07

## 2025-04-13 RX ADMIN — INSULIN LISPRO 4 UNITS: 100 INJECTION, SOLUTION INTRAVENOUS; SUBCUTANEOUS at 20:54

## 2025-04-13 RX ADMIN — INSULIN LISPRO 4 UNITS: 100 INJECTION, SOLUTION INTRAVENOUS; SUBCUTANEOUS at 12:02

## 2025-04-13 RX ADMIN — ATORVASTATIN CALCIUM 20 MG: 20 TABLET, FILM COATED ORAL at 20:54

## 2025-04-13 NOTE — CASE MANAGEMENT/SOCIAL WORK
Continued Stay Note  [facility]     Patient Name: Jesse Chapin  MRN: [id number]  Today's Date: 4/13/2025    Admit Date: 4/11/2025    Plan: Rehab   Discharge Plan       Row Name 04/13/25 1334       Plan    Plan Rehab    Patient/Family in Agreement with Plan yes    Plan Comments CM was notified by Mr. Chapin's RN that patient is interested in rehab. CM spoke with Mr. Chapin by telephone and he would like a referral to [facility]. CM will follow up on Monday and give referral to [facility].    Final Discharge Disposition Code 62 - inpatient rehab facility                   Discharge Codes    No documentation.                       Andree Pedraza, RN

## 2025-04-13 NOTE — PROGRESS NOTES
Stroke Neurology Progress Note     Subjective     This patient was seen in follow-up for:  Acute right frontal and parietal infarcts, flair negative s/p TNK on 4/11 at 1340   Present for the encounter were: self, patient, patient's wife    Subjective:  I saw and examined the patient at bedside.  No acute events overnight per chart review.  PT OT recommended rehab for which patient was hesitant but I did strongly recommend.  NIHSS 0 but patient is still having trouble walking.  All questions and concerns were answered.    Objective      Temp:  [98 °F (36.7 °C)-99.1 °F (37.3 °C)] 98.6 °F (37 °C)  Heart Rate:  [58-80] 80  Resp:  [16-18] 18  BP: ()/(48-74) 102/63            Objective    Physical Exam:  General Appearance: Alert  HEENT: anicteric sclera, no scleral injection  Lungs: respirations appear comfortable, no obvious increased work of breathing  Extremities: No cyanosis or fingernail clubbing   Skin: No rashes in exposed skin areas     Neurological Examination:   Mental status: Alert and oriented. No dysarthria. Able to name and repeat.  Cranial Nerves: Visual fields intact. Extraocular movements intact with no nystagmus. Midline gaze. Face symmetric.    Sensory: Normal sensory exam to light touch.  Motor: Normal tone. Absent pronator drift.  Strength:  LUE: 5/5 biceps, triceps,   LLE: 5/5 hip flexion/extension  RUE: 5/5 biceps, triceps,   RLE:  5/5 hip flexion/extension  Cerebellar: Finger-to-nose intact. Heel-to-shin intact. Rapid alternating movements are intact.   Gait: Per RN report, patient unstable    Labs:    Lab Results   Component Value Date    HGBA1C 8.00 (H) 04/12/2025      Lab Results   Component Value Date    CHOL 104 04/12/2025    TRIG 93 04/12/2025    HDL 47 04/12/2025    LDL 39 04/12/2025       Lab Results   Component Value Date    WBC 7.52 04/12/2025    HGB 13.4 04/12/2025    HCT 40.6 04/12/2025    MCV 90.0 04/12/2025     04/12/2025     Lab Results   Component Value Date     GLUCOSE 147 (H) 04/12/2025    BUN 16 04/12/2025    CREATININE 0.69 (L) 04/12/2025    BCR 23.2 04/12/2025    CO2 24.0 04/12/2025    CALCIUM 9.5 04/12/2025    ALBUMIN 3.6 04/11/2025    AST 18 04/11/2025    AST 17 04/11/2025    ALT 13 04/11/2025    ALT 13 04/11/2025       Results from last 7 days   Lab Units 04/12/25  0452 04/11/25  1240 04/11/25  1236   SODIUM mmol/L 136  --  137   POTASSIUM mmol/L 4.3  --  4.5   CHLORIDE mmol/L 104  --  102   CO2 mmol/L 24.0  --  25.0   BUN mg/dL 16  --  15   CREATININE mg/dL 0.69* 1.00 0.89   CALCIUM mg/dL 9.5  --  9.7   BILIRUBIN mg/dL  --   --  1.0   ALK PHOS U/L  --   --  63   ALT (SGPT) U/L  --   --  13  13   AST (SGOT) U/L  --   --  17  18   GLUCOSE mg/dL 147*  --  251*       Results Review:      All brain images and reports were personally reviewed and I agree with the interpretations except as noted below.    CT Head Without Contrast 4/12/2025  Impression: Evolving acute infarcts in the right frontal and parietal lobes. No acute intracranial hemorrhage.     MRI Brain WO CON Hyper Acute Stroke Protocol 4/11/2025  Impression: Small cortical infarcts seen along the right frontal and parietal lobes. There is no associated FLAIR signal abnormality at these areas suggesting early/hyperacute infarct.     CT Angiogram Head and Neck 4/11/2025  Impression: 1. No hemodynamically significant stenosis, large vessel cut off or aneurysms in the intracranial circulation 2. Mild to moderate carotid atherosclerotic disease, right greater than left, with approximately 50% stenosis involving the proximal right ICA.     CT Perfusion 4/11/2025  Impression: 1. No evidence of core infarct nor ischemic brain at risk.    CT Head Without Contrast Stroke Protocol 4/11/2025  Impression: 1. No evidence of acute intracranial hemorrhage or large territory infarct. 2. Age-indeterminate but possibly old small infarct in the right frontal lobe. 3. Chronic changes as above.     Transthoracic echocardiogram 4/  11/25    Left ventricular systolic function is normal. Estimated left ventricular EF = 60%    Trace to mild tricuspid regurgitation with normal RVSP.  Normal left atrium.  Saline test not performed.          Assessment/Plan     Assessment:    Jesse Chapin is an 80-year-old male PMH DMT2 (HgbA1c 8%), HLD, GERD, migraine, left hip fracture s/p nailing (2023) who presented to [facility] ED on 4/ 11/25 with gait instability and left hand coordination.  LKN 4/ 11/25 at 0900.  CT head revealed age-indeterminate right frontal lobe infarct.  CT angiogram head and neck showed mild right ICA stenoses.  Hyperacute MRI 1320 revealed flair negative acute right frontal and parietal lobe infarct.  Patient was consented and TNK was administered at 1340.  Patient was naïve to antiplatelets and anticoagulation prior to arrival.  24-hour post TNK CT head did not show bleed.  TTE 4/ 11/25 LVEF 60%, normal left atrium, saline test not performed, no thrombus.    # Acute right frontal and parietal infarcts, flair negative s/p TNK on 4/11 at 1340  # Extracranial atherosclerotic disease antiplatelet naïve  - Continue aspirin 81 mg daily  - Continue clopidogrel 75 mg daily  - Continue atorvastatin 20 mg daily LDL 39  - PT OT recommend IRF  - Recommend Holter monitor prior to discharge.   - Follow-up in stroke neurology clinic in 1 month after discharge      Patient education: call 911 or present to emergency department with any stroke symptom, including unilateral face, arm, or leg weakness, numbness, or paresthesias, unilateral facial droop, speech deficits, dizziness with nausea, vomiting, nystagmus, and incoordination, visual deficits, or severe onset headache.    No further Stroke Neurology recommendations.  Patient is cleared to discharge to IRF from stroke neurology perspective.  Thank you for this consult.  Please call with questions.     Mylene Cooper MD  [facility] [place] [facility]  04/13/25  10:00 EDT

## 2025-04-13 NOTE — PLAN OF CARE
Problem: Stroke, Ischemic (Includes Transient Ischemic Attack)  Goal: Optimal Coping  Outcome: Progressing    NIH 0 however notable gait disturbance when ambulating in [place]. Pt plans to go to [facility]  for IRF, case management to f/u tomorrow. Pt currently awaiting telemetry bed pending availability. SBP remains within ordered parameters. Stroke education provided. Pt and family members needs and questions addressed throughout shift.  Intervention: Support Psychosocial Response to Stroke  Description: Acknowledge patient and family response to the sudden impact of stroke, resulting impairments and uncertainty in recovery.Encourage verbalization of feelings regarding current situation; provide active and empathic listening; be sensitive to nonverbal communication.Engage in early, proactive and ongoing discussion about goals of care and what matters most to them; facilitate shared decision-making.Assess and monitor perspective on quality of life, personal and family wellbeing; consider palliative care approach to enhance symptom relief and quality of life.Empower patient and support system to ask questions and be actively involved in decision-making.Acknowledge and validate significance of lifestyle changes and expectations (e.g., roles and identity, rehabilitation, medication regimen, diet, exercise).Recognize current coping strategies and assist in developing new strategies, such as mindfulness, relaxation and music.Assess and monitor for signs and symptoms of poststroke depression and anxiety; consider brief psychological intervention, such as motivational interviewing or problem solving. Refer for comprehensive evaluation and treatment if symptoms persist.  Recent Flowsheet Documentation  Taken 4/13/2025 1600 by Annabelle Rico, RN  Family/Support System Care:   support provided   involvement promoted  Taken 4/13/2025 1400 by Annabelle Rico, RN  Family/Support System Care:   support provided   involvement  promoted  Taken 4/13/2025 1200 by Annabelle Rico, RN  Family/Support System Care:   support provided   involvement promoted  Taken 4/13/2025 1000 by Annabelle Rico RN  Family/Support System Care:   support provided   involvement promoted  Taken 4/13/2025 0800 by Annabelle Rico, RN  Family/Support System Care:   support provided   involvement promoted  Goal: Effective Bowel Elimination  Outcome: Progressing  Goal: Optimal Cerebral Tissue Perfusion  Outcome: Progressing  Goal: Optimal Cognitive Function  Outcome: Progressing  Goal: Improved Communication Skills  Outcome: Progressing  Goal: Optimal Functional Ability  Outcome: Progressing  Intervention: Optimize Functional Ability  Description: Initiate sitting and standing when able; evaluate and address balance, postural control and fall risk.Assess functional ability, such as ADLs (activities of daily living), mobility safety and independence; involve patient and caregiver/family in goal-setting.Encourage physical activity and optimal functional performance using a multimodal approach.Facilitate functional mobility, such as bed mobility, transfers and ambulation; progress and retrain as tolerated.Encourage ADLs (activities of daily living), such as self-feeding, hygiene and dressing; provide setup, adaptations, assistance and extra time as needed.Provide repetitive, mobility-task training for gait disturbances; consider ankle foot orthosis or functional electrical stimulation if foot drop is present.Promote a safe and accessible environment and effective use of assistive devices and equipment.Pace and cluster activity to balance with rest periods and conserve energy; promote adequate nutrition, sleep and rest.Identify and address body system and performance deficits affecting function, such as cognitive, balance, sensorimotor, activity tolerance and visual perception impairments.Consider multimodal therapeutic interventions, such as virtual reality, graded  motor imagery, robotic or treadmill training and FES (functional electrical stimulation).  Recent Flowsheet Documentation  Taken 4/13/2025 1600 by Annabelle Rico RN  Activity Management: up in chair  Taken 4/13/2025 1400 by Annabelle Rico RN  Activity Management: up in chair  Taken 4/13/2025 1200 by Annabelle Rico RN  Activity Management: up in chair  Taken 4/13/2025 1000 by Annabelle Rico RN  Activity Management: up in chair  Taken 4/13/2025 0800 by Annabelle Rico RN  Activity Management: bedrest  Goal: Optimal Nutrition Intake  Outcome: Progressing  Goal: Effective Oxygenation and Ventilation  Outcome: Progressing  Intervention: Optimize Oxygenation and Ventilation  Description: Assess and monitor airway, breathing and circulation; maintain close surveillance for deterioration.Maintain normal PaCO2 (partial pressure of carbon dioxide) to minimize risk of increased intracranial pressure and cerebral ischemia.Maintain patent airway; position to minimize risk of obstruction, aspiration and ventilation-perfusion mismatch.Provide oxygen therapy judiciously to avoid hyperoxemia; adjust to achieve oxygenation goal.Encourage airway-clearance techniques and lung expansion therapy, such as deep breathing, suction and ambulation, to minimize respiratory complication risk.Consider RMT (respiratory muscle training) to decrease the risk of pulmonary complications.Recognize risk for obstructive sleep apnea; anticipate the need for continuous pulse oximetry and positive airway pressure.Anticipate the need for intubation and mechanical ventilation for airway protection and respiratory support.  Recent Flowsheet Documentation  Taken 4/13/2025 1600 by Annabelle Rico RN  Head of Bed (HOB) Positioning: HOB at 30-45 degrees  Taken 4/13/2025 1400 by Annabelle Rico RN  Head of Bed (HOB) Positioning: HOB at 30-45 degrees  Taken 4/13/2025 1200 by Annabelle Rico RN  Head of Bed (HOB) Positioning: HOB at 30-45  degrees  Taken 4/13/2025 1000 by Annabelle Rico RN  Head of Bed (HOB) Positioning: HOB at 30-45 degrees  Taken 4/13/2025 0800 by Annabelle Rico RN  Head of Bed (HOB) Positioning: HOB at 30-45 degrees  Goal: Improved Sensorimotor Function  Outcome: Progressing  Intervention: Optimize Range of Motion, Motor Control and Function  Description: Evaluate passive and active range of motion, motor control, coordination and muscle tone.Implement multimodal therapeutic positioning and ROM (range of motion) interventions to minimize contracture risk and improve ROM; ensure edema is managed.Prevent and address shoulder pain and subluxation with interventions, such as careful positioning and handling techniques, ROM (range of motion) in protected ranges, functional orthosis and taping techniques.Involve patient and family/caregiver in treatment planning and implementation.Incorporate individualized, repetitive treatment activities that are meaningful and goal-directed.Consider treatment interventions to improve motor control and function, such as constraint-induced motor therapy or modified version, virtual reality or FES (functional electrical stimulation).If spasticity develops, quantify with a validated scale and implement interventions, such as positioning, taping and electrical stimulation.  Recent Flowsheet Documentation  Taken 4/13/2025 1600 by Annabelle Rico RN  Positioning/Transfer Devices:   pillows   in use  Taken 4/13/2025 1400 by Annabelle Rico RN  Positioning/Transfer Devices:   pillows   in use  Taken 4/13/2025 1200 by Annabelle Rico RN  Positioning/Transfer Devices:   pillows   in use  Taken 4/13/2025 1000 by Annabelle Rico RN  Positioning/Transfer Devices:   pillows   in use  Taken 4/13/2025 0800 by Annabelle Rico RN  Positioning/Transfer Devices:   pillows   in use  Intervention: Optimize Sensory and Perceptual Ability  Description: Assess visual and perceptual status and implement  interventions to address impairments.Evaluate somatosensory status to determine type and extent of impairment; consider somatosensory retraining to improve sensory discrimination.Provide interventions for hemispatial neglect, such as training in scanning of visual field.Encourage use of vision to compensate for sensory loss, such as frequent visual scanning.Identify and address any hearing or visual acuity impairments.  Recent Flowsheet Documentation  Taken 4/13/2025 1600 by Annabelle Rico RN  Pressure Reduction Techniques:   frequent weight shift encouraged   pressure points protected  Pressure Reduction Devices:   pressure-redistributing mattress utilized   positioning supports utilized   feet on footrest/footstool  Taken 4/13/2025 1400 by Annabelle Rico RN  Pressure Reduction Techniques:   frequent weight shift encouraged   pressure points protected  Pressure Reduction Devices:   pressure-redistributing mattress utilized   positioning supports utilized   chair cushion utilized  Taken 4/13/2025 1200 by Annabelle Rico RN  Pressure Reduction Techniques:   frequent weight shift encouraged   pressure points protected  Pressure Reduction Devices:   pressure-redistributing mattress utilized   positioning supports utilized  Taken 4/13/2025 1000 by Annabelle Rico RN  Pressure Reduction Techniques:   frequent weight shift encouraged   pressure points protected  Pressure Reduction Devices:   pressure-redistributing mattress utilized   positioning supports utilized   chair cushion utilized  Taken 4/13/2025 0800 by Annabelle Rico RN  Pressure Reduction Techniques:   frequent weight shift encouraged   pressure points protected  Pressure Reduction Devices:   pressure-redistributing mattress utilized   positioning supports utilized  Goal: Safe and Effective Swallow  Outcome: Progressing  Goal: Effective Urinary Elimination  Outcome: Progressing     Problem: Adult Inpatient Plan of Care  Goal: Plan of Care  Review  Outcome: Progressing  Goal: Patient-Specific Goal (Individualized)  Outcome: Progressing  Goal: Absence of Hospital-Acquired Illness or Injury  Outcome: Progressing  Intervention: Identify and Manage Fall Risk  Description: Perform standard risk assessment on admission using a validated tool or comprehensive approach appropriate to the patient; reassess fall risk frequently, with change in status or transfer to another level of care.Communicate risk to interprofessional healthcare team; ensure fall risk visible cue.Determine need for increased observation, equipment and environmental modification, as well as use of supportive, nonskid footwear.Adjust safety measures to individual needs and identified risk factors.Reinforce the importance of active participation with fall risk prevention, safety, and physical activity with the patient and family.Perform regular intentional rounding to assess need for position change, pain assessment and personal needs, including assistance with toileting.  Recent Flowsheet Documentation  Taken 4/13/2025 1600 by Annabelle Rico, RN  Safety Promotion/Fall Prevention:   safety round/check completed   room organization consistent   toileting scheduled   fall prevention program maintained   nonskid shoes/slippers when out of bed   assistive device/personal items within reach   clutter free environment maintained  Taken 4/13/2025 1400 by Annabelle Rico, RN  Safety Promotion/Fall Prevention:   safety round/check completed   room organization consistent   toileting scheduled   assistive device/personal items within reach   fall prevention program maintained   lighting adjusted   nonskid shoes/slippers when out of bed  Taken 4/13/2025 1200 by Annabelle Rico, RN  Safety Promotion/Fall Prevention:   safety round/check completed   room organization consistent   toileting scheduled   assistive device/personal items within reach   clutter free environment maintained   fall prevention  program maintained   lighting adjusted   nonskid shoes/slippers when out of bed  Taken 4/13/2025 1000 by Annabelle Rico RN  Safety Promotion/Fall Prevention:   safety round/check completed   room organization consistent   toileting scheduled   assistive device/personal items within reach   clutter free environment maintained   fall prevention program maintained   lighting adjusted   nonskid shoes/slippers when out of bed  Taken 4/13/2025 0800 by Annabelle Rico RN  Safety Promotion/Fall Prevention:   safety round/check completed   room organization consistent   toileting scheduled   assistive device/personal items within reach   clutter free environment maintained   fall prevention program maintained   lighting adjusted   nonskid shoes/slippers when out of bed  Intervention: Prevent Skin Injury  Description: Perform a screening for skin injury risk, such as pressure or moisture-associated skin damage on admission and at regular intervals throughout hospital stay.Keep all areas of skin (especially folds) clean and dry.Maintain adequate skin hydration.Relieve and redistribute pressure and protect bony prominences and skin at risk for injury; implement measures based on patient-specific risk factors.Match turning and repositioning schedule to clinical condition.Encourage weight shift frequently; assist with reposition if unable to complete independently.Float heels off bed; avoid pressure on the Achilles tendon.Keep skin free from extended contact with medical devices.Optimize nutrition and hydration.Encourage functional activity and mobility, as early as tolerated.Use aids (e.g., slide boards, mechanical lift) during transfer.  Recent Flowsheet Documentation  Taken 4/13/2025 1600 by Annabelle Rico RN  Body Position: position changed independently  Skin Protection:   transparent dressing maintained   incontinence pads utilized  Taken 4/13/2025 1400 by Annabelle Rico RN  Body Position: position changed  independently  Skin Protection:   transparent dressing maintained   incontinence pads utilized  Taken 4/13/2025 1200 by Annabelle Rico RN  Body Position: position changed independently  Skin Protection:   transparent dressing maintained   incontinence pads utilized  Taken 4/13/2025 1000 by Annabelle Rico RN  Body Position: position changed independently  Skin Protection:   transparent dressing maintained   incontinence pads utilized  Taken 4/13/2025 0800 by Annabelle Rico RN  Body Position: position changed independently  Skin Protection:   transparent dressing maintained   incontinence pads utilized  Intervention: Prevent and Manage VTE (Venous Thromboembolism) Risk  Description: Assess for VTE (venous thromboembolism) risk.Promote early mobilization; encourage both active and passive leg exercises, if unable to ambulate.Initiate and maintain compression or other therapy, as indicated, based on identified risk in accordance with organizational protocol and provider order.Recognize the patient's individual risk for bleeding before initiating pharmacologic thromboprophylaxis.  Recent Flowsheet Documentation  Taken 4/13/2025 1600 by Annabelle Rico RN  VTE Prevention/Management:   bilateral   SCDs (sequential compression devices) off  Taken 4/13/2025 1400 by Annabelle Rico RN  VTE Prevention/Management:   bilateral   SCDs (sequential compression devices) off  Taken 4/13/2025 1200 by Annabelle Rico RN  VTE Prevention/Management:   bilateral   SCDs (sequential compression devices) off  Taken 4/13/2025 1000 by Annabelle Rico RN  VTE Prevention/Management:   bilateral   SCDs (sequential compression devices) off  Taken 4/13/2025 0800 by Annabelle Rico RN  VTE Prevention/Management:   bilateral   SCDs (sequential compression devices) on  Intervention: Prevent Infection  Description: Maintain skin and mucous membrane integrity; promote hand, oral and pulmonary hygiene.Optimize fluid balance, nutrition,  sleep and glycemic control to maximize infection resistance.Identify potential sources of infection early to prevent or mitigate progression of infection (e.g., wound, lines, devices).Evaluate ongoing need for invasive devices; remove promptly when no longer indicated.Review vaccination status.  Recent Flowsheet Documentation  Taken 4/13/2025 1600 by Annabelle Rico RN  Infection Prevention:   single patient room provided   rest/sleep promoted   personal protective equipment utilized   hand hygiene promoted   equipment surfaces disinfected   environmental surveillance performed  Taken 4/13/2025 1400 by Annabelle Rico RN  Infection Prevention:   single patient room provided   rest/sleep promoted   personal protective equipment utilized   hand hygiene promoted   equipment surfaces disinfected   environmental surveillance performed  Taken 4/13/2025 1200 by Annabelle Rico RN  Infection Prevention:   single patient room provided   rest/sleep promoted   personal protective equipment utilized   hand hygiene promoted   equipment surfaces disinfected   environmental surveillance performed  Taken 4/13/2025 1000 by Annabelle Rico RN  Infection Prevention:   single patient room provided   rest/sleep promoted   personal protective equipment utilized   hand hygiene promoted   equipment surfaces disinfected   environmental surveillance performed  Taken 4/13/2025 0800 by Annabelle Rico RN  Infection Prevention:   environmental surveillance performed   single patient room provided   rest/sleep promoted   personal protective equipment utilized   hand hygiene promoted   equipment surfaces disinfected  Goal: Optimal Comfort and Wellbeing  Outcome: Progressing  Intervention: Provide Person-Centered Care  Description: Use a family-focused approach to care; encourage support system presence and participation.Develop trust and rapport by proactively providing information, encouraging questions, addressing concerns and  offering reassurance.Acknowledge emotional response to hospitalization.Recognize and utilize personal coping strategies and strengths; develop goals via shared decision-making.Honor spiritual and cultural preferences.  Recent Flowsheet Documentation  Taken 4/13/2025 1600 by Annabelle Rico RN  Trust Relationship/Rapport:   care explained   reassurance provided  Taken 4/13/2025 1400 by Annabelle Rico RN  Trust Relationship/Rapport:   care explained   reassurance provided  Taken 4/13/2025 1200 by Annabelle Rico RN  Trust Relationship/Rapport:   care explained   reassurance provided  Taken 4/13/2025 1000 by Annabelle Rico RN  Trust Relationship/Rapport:   care explained   reassurance provided  Taken 4/13/2025 0800 by Annabelle Rico RN  Trust Relationship/Rapport:   care explained   reassurance provided  Goal: Readiness for Transition of Care  Outcome: Progressing   Goal Outcome Evaluation:

## 2025-04-13 NOTE — PROGRESS NOTES
"INTENSIVIST NOTE     Hospital Day: 2    Mr. Jesse Chapin, 80 y.o. male is followed for:   Acute CVA       SUBJECTIVE     Interval history:    NIH remains 0.  Fluid balance negative.  Does note some left leg incoordination when he tries to ambulate.  24-hour head CT was negative for any bleeding yesterday.  Echocardiogram without significant abnormalities.    The patient's relevant past medical, surgical and social history were reviewed and updated in Epic as appropriate.       OBJECTIVE     Vital Sign Min/Max for last 24 hours  Temp  Min: 98 °F (36.7 °C)  Max: 98.9 °F (37.2 °C)   BP  Min: 86/54  Max: 122/67   Pulse  Min: 58  Max: 80   Resp  Min: 16  Max: 18   SpO2  Min: 90 %  Max: 97 %   No data recorded   Weight  Min: 75 kg (165 lb 5.5 oz)  Max: 75 kg (165 lb 5.5 oz)      Intake/Output Summary (Last 24 hours) at 4/13/2025 1231  Last data filed at 4/13/2025 0600  Gross per 24 hour   Intake 480 ml   Output 1225 ml   Net -745 ml      Flowsheet Rows      Flowsheet Row First Filed Value   Admission Height 170.2 cm (67\") Documented at 04/11/2025 1222   Admission Weight 74.8 kg (165 lb) Documented at 04/11/2025 1222               04/11/25  1222 04/12/25  1115 04/13/25  0400   Weight: 74.8 kg (165 lb) 74.8 kg (165 lb) 75 kg (165 lb 5.5 oz)            Objective:  General Appearance:  In no acute distress.    Vital signs: (most recent): Blood pressure 102/63, pulse 80, temperature 98.6 °F (37 °C), temperature source Oral, resp. rate 18, height 170.2 cm (67.01\"), weight 75 kg (165 lb 5.5 oz), SpO2 94%.    HEENT: Normal HEENT exam.    Lungs:  Normal effort and normal respiratory rate.  Breath sounds clear to auscultation.  He is not in respiratory distress.  No rales, wheezes or rhonchi.    Heart: Normal rate.  Regular rhythm.  S1 normal and S2 normal.  No murmur, gallop or friction rub.   Chest: Symmetric chest wall expansion.   Abdomen: Abdomen is soft and non-distended.  Bowel sounds are normal.   There is no abdominal " tenderness.   There is no mass. There is no splenomegaly. There is no hepatomegaly.   Extremities: There is no deformity or dependent edema.    Neurological: Patient is alert and oriented to person, place and time.    Pupils:  Pupils are equal, round, and reactive to light.    Skin:  Warm and dry.            Interval: baseline  1a. Level of Consciousness: 0-->Alert, keenly responsive  1b. LOC Questions: 0-->Answers both questions correctly  1c. LOC Commands: 0-->Performs both tasks correctly  2. Best Gaze: 0-->Normal  3. Visual: 0-->No visual loss  4. Facial Palsy: 0-->Normal symmetrical movements  5a. Motor Arm, Left: 0-->No drift, limb holds 90 (or 45) degrees for full 10 secs  5b. Motor Arm, Right: 0-->No drift, limb holds 90 (or 45) degrees for full 10 secs  6a. Motor Leg, Left: 0-->No drift, leg holds 30 degree position for full 5 secs  6b. Motor Leg, Right: 0-->No drift, leg holds 30 degree position for full 5 secs  7. Limb Ataxia: 0-->Absent  8. Sensory: 0-->Normal, no sensory loss  9. Best Language: 0-->No aphasia, normal  10. Dysarthria: 0-->Normal  11. Extinction and Inattention (formerly Neglect): 0-->No abnormality    Total (NIH Stroke Scale): 0     I reviewed the patient's new clinical results.  I reviewed the patient's new imaging results/reports including actual images and agree with reports.    CT Head Without Contrast  Result Date: 4/12/2025  Impression: Evolving acute infarcts in the right frontal and parietal lobes. No acute intracranial hemorrhage. Electronically Signed: Kevin Luque  4/12/2025 1:56 PM EDT  Workstation ID: [id number]    XR Chest 1 View  Result Date: 4/11/2025  Impression: No active cardiopulmonary disease Electronically Signed: Germán Stokes  4/11/2025 2:06 PM EDT  Workstation ID: [id number]    MRI Brain WO CON Hyper Acute Stroke Protocol  Result Date: 4/11/2025  Impression: Small cortical infarcts seen along the right frontal and parietal lobes. There is no associated FLAIR  signal abnormality at these areas suggesting early/hyperacute infarct. Findings of infarct discussed with NP KASHIF HERNANDEZ by Dr. Drew Browning via telephone on 4/11/2025 1:29 PM EDT. Electronically Signed: Drew Browning MD  4/11/2025 1:33 PM EDT  Workstation ID: [id number]    CT Angiogram Head w AI Analysis of LVO  Result Date: 4/11/2025  1.No hemodynamically significant stenosis, large vessel cut off or aneurysms in the intracranial circulation 2.Mild to moderate carotid atherosclerotic disease, right greater than left, with approximately 50% stenosis involving the proximal right ICA. Electronically Signed: Warren Beasley MD  4/11/2025 1:05 PM EDT  Workstation ID: [id number]    CT Angiogram Neck  Result Date: 4/11/2025  1.No hemodynamically significant stenosis, large vessel cut off or aneurysms in the intracranial circulation 2.Mild to moderate carotid atherosclerotic disease, right greater than left, with approximately 50% stenosis involving the proximal right ICA. Electronically Signed: Warren Beasley MD  4/11/2025 1:05 PM EDT  Workstation ID: [id number]    CT CEREBRAL PERFUSION WITH & WITHOUT CONTRAST  Result Date: 4/11/2025   1. No evidence of core infarct nor ischemic brain at risk. Electronically Signed: Warren Beasley MD  4/11/2025 1:02 PM EDT  Workstation ID: [id number]    CT Head Without Contrast Stroke Protocol  Result Date: 4/11/2025  Impression: 1.No evidence of acute intracranial hemorrhage or large territory infarct. 2.Age-indeterminate but possibly old small infarct in the right frontal lobe. 3.Chronic changes as above. Electronically Signed: Drew Browning MD  4/11/2025 12:42 PM EDT  Workstation ID: [id number]       INFUSIONS       Results from last 7 days   Lab Units 04/12/25  0452 04/11/25  1240 04/11/25  1236   WBC 10*3/mm3 7.52  --  5.82   HEMOGLOBIN g/dL 13.4  --  13.5   HEMOGLOBIN, POC g/dL  --  13.9  --    HEMATOCRIT % 40.6  --  42.2   HEMATOCRIT POC %  --  41  --    PLATELETS  10*3/mm3 164  --  152     Results from last 7 days   Lab Units 04/12/25  0452 04/11/25  1240 04/11/25  1236   SODIUM mmol/L 136  --  137   POTASSIUM mmol/L 4.3  --  4.5   CHLORIDE mmol/L 104  --  102   CO2 mmol/L 24.0  --  25.0   BUN mg/dL 16  --  15   GLUCOSE mg/dL 147*  --  251*   CREATININE mg/dL 0.69* 1.00 0.89   CALCIUM mg/dL 9.5  --  9.7   ALBUMIN g/dL  --   --  3.6               Patient isn't on Tube Feeding   /h  Patient doesn't have any tube feeding modular orders    Mechanical Ventilator:   Settings: Observed:                                                I reviewed the patient's medications.    Assessment & Plan   ASSESSMENT/PLAN     Active Hospital Problems    Diagnosis     **AIS s/p TNK     Dyslipidemia     T2DM (type 2 diabetes mellitus)        80-year-old male with a past medical history significant for type 2 diabetes mellitus, dyslipidemia, and migraine headaches.  He presented to the emergency department on 4/11 complaining of difficulty walking, dysarthria, and left hand incoordination. He noticed this when he woke at 0900. He had previously been awake briefly at 0800 and noted no difficulty. His wife was at the store and when she returned she brought him to the ER. His symptoms had improved somewhat prior to evaluation here. A hyperacute MRI revealed small cortical infarct seen along the right frontal and parietal lobes. There was associated FLAIR signal abnormalities in these areas suggesting an acute infarct. Routine CT imaging and perfusion without significant abnormalities. There was some mild to moderate atherosclerotic disease noted on angiogram.  He received TNK at 1354 on that date.    NIH remains 0.  He does note a bit of coordination on his left side when he tries to walk.  24-hour head CT was negative for bleed yesterday.  It revealed evolving infarcts.  Echocardiogram was negative for any shunting or clots.    Plan:    Antiplatelet therapy per stroke team.  They have recommended  DAPT.  Statin  Sliding-scale insulin  Further recommendations per stroke service     I discussed the patient's findings and my recommendations with patient, family, and nursing staff     Plan of care and goals reviewed with multidisciplinary team at daily rounds.    Any copied data from previous notes included in the (1) History of Present Illness, (2) Physical Examination and (3) Medical Decision Making and/or Assessment and Plan has been reviewed and is accurate as of 04/13/25    .    Ruben Hawkins MD  Pulmonary and Critical Care Medicine  04/13/25 12:31 EDT

## 2025-04-14 VITALS
WEIGHT: 165.34 LBS | RESPIRATION RATE: 16 BRPM | DIASTOLIC BLOOD PRESSURE: 63 MMHG | HEART RATE: 88 BPM | HEIGHT: 67 IN | BODY MASS INDEX: 25.95 KG/M2 | TEMPERATURE: 98.3 F | OXYGEN SATURATION: 95 % | SYSTOLIC BLOOD PRESSURE: 96 MMHG

## 2025-04-14 LAB
GLUCOSE BLDC GLUCOMTR-MCNC: 140 MG/DL (ref 70–130)
GLUCOSE BLDC GLUCOMTR-MCNC: 190 MG/DL (ref 70–130)

## 2025-04-14 PROCEDURE — 82948 REAGENT STRIP/BLOOD GLUCOSE: CPT

## 2025-04-14 PROCEDURE — 63710000001 INSULIN LISPRO (HUMAN) PER 5 UNITS: Performed by: NURSE PRACTITIONER

## 2025-04-14 PROCEDURE — 99239 HOSP IP/OBS DSCHRG MGMT >30: CPT | Performed by: HOSPITALIST

## 2025-04-14 RX ORDER — ASPIRIN 81 MG/1
81 TABLET, CHEWABLE ORAL DAILY
Qty: 30 TABLET | Refills: 2 | Status: SHIPPED | OUTPATIENT
Start: 2025-04-15 | End: 2025-07-14

## 2025-04-14 RX ORDER — CLOPIDOGREL BISULFATE 75 MG/1
75 TABLET ORAL DAILY
Qty: 30 TABLET | Refills: 2 | Status: SHIPPED | OUTPATIENT
Start: 2025-04-15 | End: 2025-07-14

## 2025-04-14 RX ORDER — ATORVASTATIN CALCIUM 20 MG/1
40 TABLET, FILM COATED ORAL DAILY
Qty: 90 TABLET | Refills: 0 | Status: SHIPPED | OUTPATIENT
Start: 2025-04-14

## 2025-04-14 RX ADMIN — INSULIN LISPRO 2 UNITS: 100 INJECTION, SOLUTION INTRAVENOUS; SUBCUTANEOUS at 12:15

## 2025-04-14 RX ADMIN — CLOPIDOGREL BISULFATE 75 MG: 75 TABLET, FILM COATED ORAL at 08:40

## 2025-04-14 RX ADMIN — MUPIROCIN 1 APPLICATION: 20 OINTMENT TOPICAL at 08:40

## 2025-04-14 RX ADMIN — ASPIRIN 81 MG: 81 TABLET, CHEWABLE ORAL at 08:40

## 2025-04-14 NOTE — PROGRESS NOTES
[facility] Medicine Services  PROGRESS NOTE    Patient Name: Jesse Chapin  : 1945  MRN: [id number]    Date of Admission: 2025  Primary Care Physician: Andree Burroughs MD    Subjective   Subjective     CC: L weakness    HPI: L weakness/LLE. Otherwise no symptoms. No f/c. NO n/v. No dysphagia/dysarthria.       Objective   Objective     Vital Signs:   Temp:  [97.8 °F (36.6 °C)-99 °F (37.2 °C)] 98.1 °F (36.7 °C)  Heart Rate:  [] 81  Resp:  [14-20] 16  BP: ()/(57-92) 114/68     Physical Exam:  NAD, alert and oriented  OP clear, dry MM  RRR  Decreased at bases  +BS, soft  ANTHONY  Normal affect    Results Reviewed:  LAB RESULTS:      Lab 25  1240 25  1236   WBC 7.52  --  5.82   HEMOGLOBIN 13.4  --  13.5   HEMOGLOBIN, POC  --  13.9  --    HEMATOCRIT 40.6  --  42.2   HEMATOCRIT POC  --  41  --    PLATELETS 164  --  152   NEUTROS ABS 4.89  --  3.21   IMMATURE GRANS (ABS) 0.03  --  0.02   LYMPHS ABS 1.54  --  1.70   MONOS ABS 0.74  --  0.59   EOS ABS 0.27  --  0.26   MCV 90.0  --  92.5   PROTIME  --   --  13.7   APTT  --   --  28.8         Lab 25  0452 25  1240 25  1236   SODIUM 136  --  137   POTASSIUM 4.3  --  4.5   CHLORIDE 104  --  102   CO2 24.0  --  25.0   ANION GAP 8.0  --  10.0   BUN 16  --  15   CREATININE 0.69* 1.00 0.89   EGFR 92.4 75.1 85.6   GLUCOSE 147*  --  251*   CALCIUM 9.5  --  9.7   HEMOGLOBIN A1C 8.00*  --   --    TSH 0.971  --   --          Lab 25  1236   TOTAL PROTEIN 6.7   ALBUMIN 3.6   GLOBULIN 3.1   ALT (SGPT) 13  13   AST (SGOT) 17  18   BILIRUBIN 1.0   ALK PHOS 63         Lab 25  1236   PROTIME 13.7   INR 0.99         Lab 25  0452   CHOLESTEROL 104   LDL CHOL 39   HDL CHOL 47   TRIGLYCERIDES 93             Brief Urine Lab Results       None            Microbiology Results Abnormal       None            CT Head Without Contrast  Result Date: 2025  CT HEAD WO CONTRAST Date of Exam:  4/12/2025 1:29 PM EDT Indication: Stroke, follow up 24 hour post-TNK 24 Hours Post Thrombolytic Administration. Comparison: Brain MRI 4/11/2025 Technique: Axial CT images were obtained of the head without contrast administration.  Automated exposure control and iterative construction methods were used. Findings: No acute intracranial hemorrhage.. Evolving acute infarcts in the right frontal and parietal lobes. No extra-axial fluid collection.No significant mass effect. No hydrocephalus. Mild generalized parenchymal volume loss. Scattered areas of periventricular and subcortical white matter hypoattenuation, nonspecific, perhaps from small vessel ischemic/hypertensive changes in a patient of this age.There are intracranial atherosclerotic calcifications. Mild scattered mucosal thickening in the paranasal sinuses.Mastoid air cells are essentially clear.Included globes and orbits appear unremarkable by CT. No acute or aggressive appearing bony or extracranial soft tissue process.     Impression: Impression: Evolving acute infarcts in the right frontal and parietal lobes. No acute intracranial hemorrhage. Electronically Signed: Kevin Luque  4/12/2025 1:56 PM EDT  Workstation ID: [id number]      Results for orders placed during the hospital encounter of 04/11/25    Adult Transthoracic Echo Complete W/ Cont if Necessary Per Protocol (With Agitated Saline)    Interpretation Summary    Left ventricular systolic function is normal. Estimated left ventricular EF = 60%    Trace to mild tricuspid regurgitation with normal RVSP.      Current medications:  Scheduled Meds:aspirin, 81 mg, Oral, Daily  atorvastatin, 20 mg, Oral, Nightly  clopidogrel, 75 mg, Oral, Daily  insulin lispro, 2-9 Units, Subcutaneous, 4x Daily AC & at Bedtime  mupirocin, 1 Application, Each Nare, BID      Continuous Infusions:   PRN Meds:.  dextrose    dextrose    glucagon (human recombinant)    sodium chloride    sodium chloride    Assessment & Plan    Assessment & Plan     Active Hospital Problems    Diagnosis  POA    **AIS s/p TNK [I63.9]  Yes    Dyslipidemia [E78.5]  Yes    T2DM (type 2 diabetes mellitus) [E11.9]  Yes      Resolved Hospital Problems   No resolved problems to display.        Brief Hospital Course to date:  Jesse Chapin is a 80 y.o. male with history of DM, DL, migraines, here with acute L weakness, s/p TNK for AIS.     AIS s/p TNK, R frontal/parietal  -DAPT/statin  -PT/OT, rehab  -Holter at [facility]    DM  -relatively stable trend    DL  -statin    Expected Discharge Location and Transportation: [facility]  Expected Discharge   Expected Discharge Date: 4/15/2025; Expected Discharge Time:      VTE Prophylaxis:  Mechanical VTE prophylaxis orders are present.         AM-PAC 6 Clicks Score (PT): 19 (04/13/25 2140)    CODE STATUS:   Code Status and Medical Interventions: CPR (Attempt to Resuscitate); Full Support   Ordered at: 04/11/25 9958     Code Status (Patient has no pulse and is not breathing):    CPR (Attempt to Resuscitate)     Medical Interventions (Patient has pulse or is breathing):    Full Support       Colton Paz MD  04/14/25

## 2025-04-14 NOTE — DISCHARGE SUMMARY
[facility] Medicine Services  DISCHARGE SUMMARY    Patient Name: Jesse Chapin  : 1945  MRN: [id number]    Date of Admission: 2025 12:28 PM  Date of Discharge:  2025  Primary Care Physician: Andree Burroughs MD    Consults       Date and Time Order Name Status Description    2025 12:26 PM Inpatient Neurology Consult Stroke              Hospital Course     Presenting Problem: CVA    Active Hospital Problems    Diagnosis  POA    **AIS s/p TNK [I63.9]  Yes    Dyslipidemia [E78.5]  Yes    T2DM (type 2 diabetes mellitus) [E11.9]  Yes      Resolved Hospital Problems   No resolved problems to display.          Hospital Course:  Jesse Chapin is a 80 y.o. male with history of DM, DL, migraines, here with acute L weakness, s/p TNK for AIS.      AIS s/p TNK, R frontal/parietal  -DAPT/statin  -PT/OT, rehab  -Holter at DC  -Stroke neurology follow up 1 month     DM  -relatively stable trend, resume PO medications at DC     DL  -statin    Discharge Follow Up Recommendations for outpatient labs/diagnostics:  As written    Day of Discharge     HPI:  L weakness/LLE. Otherwise no symptoms. No f/c. NO n/v. No dysphagia/dysarthria.         Objective  Objective      Vital Signs:   Temp:  [97.8 °F (36.6 °C)-99 °F (37.2 °C)] 98.1 °F (36.7 °C)  Heart Rate:  [] 81  Resp:  [14-20] 16  BP: ()/(57-92) 114/68     Physical Exam:  NAD, alert and oriented  OP clear, dry MM  RRR  Decreased at bases  +BS, soft  ANTHONY  Normal affect    Pertinent  and/or Most Recent Results     LAB RESULTS:      Lab 25  0452 25  1240 25  1236   WBC 7.52  --  5.82   HEMOGLOBIN 13.4  --  13.5   HEMOGLOBIN, POC  --  13.9  --    HEMATOCRIT 40.6  --  42.2   HEMATOCRIT POC  --  41  --    PLATELETS 164  --  152   NEUTROS ABS 4.89  --  3.21   IMMATURE GRANS (ABS) 0.03  --  0.02   LYMPHS ABS 1.54  --  1.70   MONOS ABS 0.74  --  0.59   EOS ABS 0.27  --  0.26   MCV 90.0  --  92.5   PROTIME  --   --   13.7   APTT  --   --  28.8         Lab 04/12/25  0452 04/11/25  1240 04/11/25  1236   SODIUM 136  --  137   POTASSIUM 4.3  --  4.5   CHLORIDE 104  --  102   CO2 24.0  --  25.0   ANION GAP 8.0  --  10.0   BUN 16  --  15   CREATININE 0.69* 1.00 0.89   EGFR 92.4 75.1 85.6   GLUCOSE 147*  --  251*   CALCIUM 9.5  --  9.7   HEMOGLOBIN A1C 8.00*  --   --    TSH 0.971  --   --          Lab 04/11/25  1236   TOTAL PROTEIN 6.7   ALBUMIN 3.6   GLOBULIN 3.1   ALT (SGPT) 13  13   AST (SGOT) 17  18   BILIRUBIN 1.0   ALK PHOS 63         Lab 04/11/25  1236   PROTIME 13.7   INR 0.99         Lab 04/12/25  0452   CHOLESTEROL 104   LDL CHOL 39   HDL CHOL 47   TRIGLYCERIDES 93             Brief Urine Lab Results       None          Microbiology Results (last 10 days)       ** No results found for the last 240 hours. **            CT Head Without Contrast  Result Date: 4/12/2025  CT HEAD WO CONTRAST Date of Exam: 4/12/2025 1:29 PM EDT Indication: Stroke, follow up 24 hour post-TNK 24 Hours Post Thrombolytic Administration. Comparison: Brain MRI 4/11/2025 Technique: Axial CT images were obtained of the head without contrast administration.  Automated exposure control and iterative construction methods were used. Findings: No acute intracranial hemorrhage.. Evolving acute infarcts in the right frontal and parietal lobes. No extra-axial fluid collection.No significant mass effect. No hydrocephalus. Mild generalized parenchymal volume loss. Scattered areas of periventricular and subcortical white matter hypoattenuation, nonspecific, perhaps from small vessel ischemic/hypertensive changes in a patient of this age.There are intracranial atherosclerotic calcifications. Mild scattered mucosal thickening in the paranasal sinuses.Mastoid air cells are essentially clear.Included globes and orbits appear unremarkable by CT. No acute or aggressive appearing bony or extracranial soft tissue process.     Impression: Evolving acute infarcts in the  right frontal and parietal lobes. No acute intracranial hemorrhage. Electronically Signed: Kevin Luque  4/12/2025 1:56 PM EDT  Workstation ID: [id number]    XR Chest 1 View  Result Date: 4/11/2025  XR CHEST 1 VW Date of Exam: 4/11/2025 1:40 PM EDT Indication: Acute Stroke Protocol (onset < 12 hrs) Comparison: None available. Findings: Heart size and pulmonary vasculature are within normal limits lungs clear other than mild atelectasis in the right lung base. Costophrenic angle sharp     Impression: No active cardiopulmonary disease Electronically Signed: Germán Stokes  4/11/2025 2:06 PM EDT  Workstation ID: [id number]    MRI Brain WO CON Hyper Acute Stroke Protocol  Result Date: 4/11/2025  MRI BRAIN WO CON HYPER ACUTE STROKE PROTOCOL Date of Exam: 4/11/2025 1:20 PM EDT Indication: balance dificulty, left arm incoordination.  Comparison: Same day CT stroke Technique:  Abbreviated hyper-acute stroke brain MRI protocol was utilized. The following sequences were obtained, without contrast administration: multiplanar localizer images, axial diffusion, axial ADC, axial FLAIR. Findings: Small cortical infarcts seen along the right frontal and parietal lobes. There is no associated FLAIR signal abnormality at these areas suggesting early/hyperacute infarct. Mild parenchymal volume loss. No midline shift or herniation. Scattered periventricular and subcortical FLAIR hyperintensities are nonspecific but most often seen with chronic small vessel ischemic changes. Scattered paranasal sinus mucosal thickening.     Impression: Small cortical infarcts seen along the right frontal and parietal lobes. There is no associated FLAIR signal abnormality at these areas suggesting early/hyperacute infarct. Findings of infarct discussed with NP KASHIF HERNANDEZ by Dr. Drew Browning via telephone on 4/11/2025 1:29 PM EDT. Electronically Signed: Drew Browning MD  4/11/2025 1:33 PM EDT  Workstation ID: [id number]    CT Angiogram  Head w AI Analysis of LVO  Result Date: 4/11/2025  CT ANGIOGRAM HEAD W AI ANALYSIS OF LVO, CT ANGIOGRAM NECK Date of Exam: 4/11/2025 12:40 PM EDT Indication: Neuro Deficit, acute, Stroke suspected Neuro deficit, acute stroke suspected. Comparison: None available. Technique: CTA of the head and neck was performed after the uneventful intravenous administration of 125 cc Isovue-370. Reconstructed coronal and sagittal images were also obtained. In addition, a 3-D volume rendered image was created for interpretation.  Automated exposure control and iterative reconstruction methods were used. Findings: CTA NECK: *Aortic arch: No aneurysm. No significant stenosis or occlusion of the major arch vessels. *Left carotid system: Mild to moderate distal CCA and proximal ICA atherosclerotic disease with less than 50%. No aneurysm, additional stenosis or occlusion. *Right carotid system: Moderate distal CCA and proximal ICA atherosclerotic disease with approximately 50% stenosis of the proximal right ICA. No aneurysm, additional stenosis or occlusion. *Vertebrobasilar system: The vertebral arteries arise from their respective subclavian arteries. No aneurysm, significant stenosis or occlusion. CTA HEAD: *Anterior circulation: No aneurysm, significant stenosis or occlusion. *Posterior circulation: No aneurysm, significant stenosis or occlusion. *Anatomic variants: None of significance. *Venous sinuses: Patent.     1.No hemodynamically significant stenosis, large vessel cut off or aneurysms in the intracranial circulation 2.Mild to moderate carotid atherosclerotic disease, right greater than left, with approximately 50% stenosis involving the proximal right ICA. Electronically Signed: Warren Beasley MD  4/11/2025 1:05 PM EDT  Workstation ID: [id number]    CT Angiogram Neck  Result Date: 4/11/2025  CT ANGIOGRAM HEAD W AI ANALYSIS OF LVO, CT ANGIOGRAM NECK Date of Exam: 4/11/2025 12:40 PM EDT Indication: Neuro Deficit, acute, Stroke  suspected Neuro deficit, acute stroke suspected. Comparison: None available. Technique: CTA of the head and neck was performed after the uneventful intravenous administration of 125 cc Isovue-370. Reconstructed coronal and sagittal images were also obtained. In addition, a 3-D volume rendered image was created for interpretation.  Automated exposure control and iterative reconstruction methods were used. Findings: CTA NECK: *Aortic arch: No aneurysm. No significant stenosis or occlusion of the major arch vessels. *Left carotid system: Mild to moderate distal CCA and proximal ICA atherosclerotic disease with less than 50%. No aneurysm, additional stenosis or occlusion. *Right carotid system: Moderate distal CCA and proximal ICA atherosclerotic disease with approximately 50% stenosis of the proximal right ICA. No aneurysm, additional stenosis or occlusion. *Vertebrobasilar system: The vertebral arteries arise from their respective subclavian arteries. No aneurysm, significant stenosis or occlusion. CTA HEAD: *Anterior circulation: No aneurysm, significant stenosis or occlusion. *Posterior circulation: No aneurysm, significant stenosis or occlusion. *Anatomic variants: None of significance. *Venous sinuses: Patent.     1.No hemodynamically significant stenosis, large vessel cut off or aneurysms in the intracranial circulation 2.Mild to moderate carotid atherosclerotic disease, right greater than left, with approximately 50% stenosis involving the proximal right ICA. Electronically Signed: Warren Beasley MD  4/11/2025 1:05 PM EDT  Workstation ID: [id number]    CT CEREBRAL PERFUSION WITH & WITHOUT CONTRAST  Result Date: 4/11/2025  CT CEREBRAL PERFUSION W WO CONTRAST Date of Exam: 4/11/2025 12:40 PM EDT Indication: Neuro Deficit, acute, Stroke suspected Neuro deficit, acute stroke suspected.  Comparison: None available. Technique: Axial CT images of the brain were obtained prior to and after the administration of 125 cc  Isovue-370. Core blood volume, core blood flow, mean transit time, and Tmax images were obtained utilizing the Rapid software protocol. A limited CT angiogram of the head was also performed to measure the blood vessel density. The radiation dose reduction device was turned on for each scan per the ALARA (As Low as Reasonably Achievable) protocol. Findings:    Cerebral blood flow, blood volume, and mean transit time maps are symmetric without large perfusion defect. There are areas of elevated Tmax greater than 4 seconds scattered throughout the right and left cerebellum and right cerebral hemisphere of uncertain significance.  CBF<30% volume: 0mL Tmax>6sec volume: 0 mL Mismatch volume: 0mL Mismatch ratio: None          1. No evidence of core infarct nor ischemic brain at risk. Electronically Signed: Warren Beasley MD  4/11/2025 1:02 PM EDT  Workstation ID: [id number]    CT Head Without Contrast Stroke Protocol  Result Date: 4/11/2025  CT HEAD WO CONTRAST STROKE PROTOCOL Date of Exam: 4/11/2025 12:32 PM EDT Indication: Neuro deficit, acute, stroke suspected Neuro Deficit, acute, Stroke suspected. Comparison: None available. Technique: Axial CT images were obtained of the head without contrast administration.  Reconstructed coronal images were also obtained. Automated exposure control and iterative construction methods were used. Scan Time: 12:34 p.m. Results discussed with stroke team at 12:40 p.m. Findings: Parenchyma:No acute intraparenchymal hemorrhage. No loss of gray-white differentiation to suggest large territory infarct. Moderate parenchymal volume loss. Scattered periventricular and subcortical white matter hypodensities, nonspecific, but most often  consistent with small vessel ischemic changes. Age-indeterminate but possibly old small infarct in the right frontal lobe. No midline shift or herniation. Ventricles and extra axial spaces:Prominent ventricles and sulci secondary to volume loss. No extra axial  fluid collection seen. Other:Orbits are grossly intact. Paranasal sinuses are clear. Mastoid air cells are clear. Calvarium is intact. Intracranial atherosclerotic calcification is present.     Impression: 1.No evidence of acute intracranial hemorrhage or large territory infarct. 2.Age-indeterminate but possibly old small infarct in the right frontal lobe. 3.Chronic changes as above. Electronically Signed: Drew Browning MD  4/11/2025 12:42 PM EDT  Workstation ID: [id number]              Results for orders placed during the hospital encounter of 04/11/25    Adult Transthoracic Echo Complete W/ Cont if Necessary Per Protocol (With Agitated Saline)    Interpretation Summary    Left ventricular systolic function is normal. Estimated left ventricular EF = 60%    Trace to mild tricuspid regurgitation with normal RVSP.      Plan for Follow-up of Pending Labs/Results: Reviewed    Discharge Details        Discharge Medications        New Medications        Instructions Start Date   aspirin 81 MG chewable tablet   81 mg, Oral, Daily   Start Date: April 15, 2025     clopidogrel 75 MG tablet  Commonly known as: PLAVIX   75 mg, Oral, Daily   Start Date: April 15, 2025            Changes to Medications        Instructions Start Date   atorvastatin 20 MG tablet  Commonly known as: LIPITOR  What changed: how much to take   40 mg, Oral, Daily             Continue These Medications        Instructions Start Date   ferrous sulfate 324 (65 Fe) MG tablet delayed-release EC tablet   324 mg, Daily With Breakfast      multivitamin with minerals tablet  Generic drug: multivitamin with minerals   1 tablet, Daily      Semaglutide 14 MG tablet   1 tablet, Daily      SITagliptin 100 MG tablet  Commonly known as: JANUVIA   100 mg, Daily      vitamin B-12 500 MCG tablet  Commonly known as: CYANOCOBALAMIN   500 mcg, Daily             Stop These Medications      acetaminophen 500 MG tablet  Commonly known as: TYLENOL              No Known  Allergies      Discharge Disposition:  Rehab Facility or Unit (DC - External)    Diet:  Hospital:  Diet Order   Procedures    Diet: Regular/House, Diabetic; Consistent Carbohydrate; Fluid Consistency: Thin (IDDSI 0)            Activity:      Restrictions or Other Recommendations:         CODE STATUS:    Code Status and Medical Interventions: CPR (Attempt to Resuscitate); Full Support   Ordered at: 04/11/25 1453     Code Status (Patient has no pulse and is not breathing):    CPR (Attempt to Resuscitate)     Medical Interventions (Patient has pulse or is breathing):    Full Support       No future appointments.    Additional Instructions for the Follow-ups that You Need to Schedule       Ambulatory Referral to [facility]   As directed      Service Requested: Cardiac Monitor        Ambulatory Referral to Neurology   As directed      1 month post hospital follow-up    Order Comments: 1 month post hospital follow-up         Discharge Follow-up with PCP   As directed       Currently Documented PCP:    Andree Burroughs MD    PCP Phone Number:    [phone]     Follow Up Details: 1 week after rehab                      Colton Paz MD  04/14/25      Time Spent on Discharge:  I spent  32  minutes on this discharge activity which included: face-to-face encounter with the patient, reviewing the data in the system, coordination of the care with the nursing staff as well as consultants, documentation, and entering orders.

## 2025-04-14 NOTE — PLAN OF CARE
Problem: Stroke, Ischemic (Includes Transient Ischemic Attack)  Goal: Optimal Coping  Outcome: Progressing  Intervention: Support Psychosocial Response to Stroke  Recent Flowsheet Documentation  Taken 4/13/2025 2140 by Lynne Cuevas RN  Supportive Measures: active listening utilized  Family/Support System Care: self-care encouraged  Goal: Effective Bowel Elimination  Outcome: Progressing  Goal: Optimal Cerebral Tissue Perfusion  Outcome: Progressing  Goal: Optimal Cognitive Function  Outcome: Progressing  Goal: Improved Communication Skills  Outcome: Progressing  Goal: Optimal Functional Ability  Outcome: Progressing  Goal: Optimal Nutrition Intake  Outcome: Progressing  Goal: Effective Oxygenation and Ventilation  Outcome: Progressing  Intervention: Optimize Oxygenation and Ventilation  Recent Flowsheet Documentation  Taken 4/14/2025 0400 by Lynne Cuevas RN  Head of Bed (HOB) Positioning: HOB elevated  Taken 4/14/2025 0200 by Lynne Cuevas RN  Head of Bed (HOB) Positioning: HOB elevated  Taken 4/14/2025 0000 by Lynne Cuevas RN  Head of Bed (HOB) Positioning: HOB elevated  Taken 4/13/2025 2200 by Lynne Cuevas RN  Head of Bed (HOB) Positioning: HOB elevated  Taken 4/13/2025 2140 by Lynne Cuevas RN  Head of Bed (HOB) Positioning: HOB elevated  Goal: Improved Sensorimotor Function  Outcome: Progressing  Intervention: Optimize Range of Motion, Motor Control and Function  Recent Flowsheet Documentation  Taken 4/14/2025 0400 by Lynne Cuevas RN  Positioning/Transfer Devices:   pillows   in use  Taken 4/14/2025 0200 by Lynne Cuevas RN  Positioning/Transfer Devices:   pillows   in use  Taken 4/14/2025 0000 by Lynne Cuevas RN  Positioning/Transfer Devices:   pillows   in use  Taken 4/13/2025 2200 by Lynne Cuevas RN  Positioning/Transfer Devices:   pillows   in use  Taken 4/13/2025 2140 by Lynne Cuevas RN  Positioning/Transfer Devices:    pillows   in use  Intervention: Optimize Sensory and Perceptual Ability  Recent Flowsheet Documentation  Taken 4/14/2025 0400 by Lynne Cuevas RN  Pressure Reduction Techniques:   frequent weight shift encouraged   heels elevated off bed   pressure points protected  Pressure Reduction Devices:   pressure-redistributing mattress utilized   positioning supports utilized   heel offloading device utilized  Taken 4/14/2025 0200 by Lynne Cuevas RN  Pressure Reduction Techniques:   frequent weight shift encouraged   heels elevated off bed   pressure points protected  Pressure Reduction Devices:   pressure-redistributing mattress utilized   positioning supports utilized   heel offloading device utilized  Taken 4/14/2025 0000 by Lynne Cuevas RN  Pressure Reduction Techniques:   frequent weight shift encouraged   heels elevated off bed   pressure points protected  Pressure Reduction Devices:   pressure-redistributing mattress utilized   positioning supports utilized   heel offloading device utilized  Taken 4/13/2025 2200 by Lynne Cuevas RN  Pressure Reduction Techniques:   frequent weight shift encouraged   heels elevated off bed   pressure points protected  Pressure Reduction Devices:   pressure-redistributing mattress utilized   positioning supports utilized   heel offloading device utilized  Taken 4/13/2025 2140 by Lynne Cuevas RN  Pressure Reduction Techniques:   frequent weight shift encouraged   heels elevated off bed   pressure points protected  Pressure Reduction Devices:   pressure-redistributing mattress utilized   positioning supports utilized   heel offloading device utilized  Goal: Safe and Effective Swallow  Outcome: Progressing  Goal: Effective Urinary Elimination  Outcome: Progressing     Problem: Adult Inpatient Plan of Care  Goal: Plan of Care Review  Outcome: Progressing  Goal: Patient-Specific Goal (Individualized)  Outcome: Progressing  Goal: Absence of  Hospital-Acquired Illness or Injury  Outcome: Progressing  Intervention: Identify and Manage Fall Risk  Recent Flowsheet Documentation  Taken 4/14/2025 0400 by Lynne Cuevas RN  Safety Promotion/Fall Prevention:   activity supervised   assistive device/personal items within reach   clutter free environment maintained   lighting adjusted   room organization consistent   nonskid shoes/slippers when out of bed  Taken 4/14/2025 0200 by Weathers, Lynne, RN  Safety Promotion/Fall Prevention:   activity supervised   clutter free environment maintained   assistive device/personal items within reach   lighting adjusted   nonskid shoes/slippers when out of bed   room organization consistent  Taken 4/14/2025 0000 by Weathers, Lynne, RN  Safety Promotion/Fall Prevention:   activity supervised   assistive device/personal items within reach   clutter free environment maintained   lighting adjusted   nonskid shoes/slippers when out of bed   room organization consistent  Taken 4/13/2025 2200 by Weathers, Lynne, RN  Safety Promotion/Fall Prevention:   activity supervised   assistive device/personal items within reach   clutter free environment maintained   lighting adjusted   nonskid shoes/slippers when out of bed   room organization consistent  Taken 4/13/2025 2140 by Weathers, Lynne, RN  Safety Promotion/Fall Prevention:   assistive device/personal items within reach   activity supervised   clutter free environment maintained   lighting adjusted   nonskid shoes/slippers when out of bed   room organization consistent  Intervention: Prevent Skin Injury  Recent Flowsheet Documentation  Taken 4/14/2025 0400 by Lynne Cuevas RN  Body Position: position changed independently  Skin Protection:   incontinence pads utilized   transparent dressing maintained  Taken 4/14/2025 0200 by Lynne Cuevas RN  Body Position: position changed independently  Skin Protection:   incontinence pads utilized   silicone  foam dressing in place   transparent dressing maintained  Taken 4/14/2025 0000 by Lynne Cuevas RN  Body Position: position changed independently  Skin Protection:   incontinence pads utilized   transparent dressing maintained  Taken 4/13/2025 2200 by Lynne Cuevas RN  Body Position: position changed independently  Skin Protection:   incontinence pads utilized   transparent dressing maintained  Taken 4/13/2025 2140 by Lynne Cuevas RN  Body Position: position changed independently  Skin Protection:   incontinence pads utilized   transparent dressing maintained  Intervention: Prevent and Manage VTE (Venous Thromboembolism) Risk  Recent Flowsheet Documentation  Taken 4/13/2025 2140 by Lynne Cuevas RN  VTE Prevention/Management:   bilateral   SCDs (sequential compression devices) off  Intervention: Prevent Infection  Recent Flowsheet Documentation  Taken 4/14/2025 0400 by Lynne Cuevas RN  Infection Prevention: environmental surveillance performed  Taken 4/14/2025 0200 by Lynne Cuevas RN  Infection Prevention: environmental surveillance performed  Taken 4/14/2025 0000 by Lynne Cuevas RN  Infection Prevention: environmental surveillance performed  Taken 4/13/2025 2200 by Lynne Cuevas RN  Infection Prevention: environmental surveillance performed  Taken 4/13/2025 2140 by Lynne Cuevas RN  Infection Prevention: environmental surveillance performed  Goal: Optimal Comfort and Wellbeing  Outcome: Progressing  Intervention: Provide Person-Centered Care  Recent Flowsheet Documentation  Taken 4/13/2025 2140 by Lynne Cuevas RN  Trust Relationship/Rapport:   care explained   choices provided   thoughts/feelings acknowledged  Goal: Readiness for Transition of Care  Outcome: Progressing     Problem: Skin Injury Risk Increased  Goal: Skin Health and Integrity  Outcome: Progressing  Intervention: Optimize Skin Protection  Recent Flowsheet Documentation  Taken  4/14/2025 0400 by Lynne Cuevas RN  Pressure Reduction Techniques:   frequent weight shift encouraged   heels elevated off bed   pressure points protected  Head of Bed (HOB) Positioning: [facility] elevated  Pressure Reduction Devices:   pressure-redistributing mattress utilized   positioning supports utilized   heel offloading device utilized  Skin Protection:   incontinence pads utilized   transparent dressing maintained  Taken 4/14/2025 0200 by Lynne Cuevas RN  Pressure Reduction Techniques:   frequent weight shift encouraged   heels elevated off bed   pressure points protected  Head of Bed (HOB) Positioning: [facility] elevated  Pressure Reduction Devices:   pressure-redistributing mattress utilized   positioning supports utilized   heel offloading device utilized  Skin Protection:   incontinence pads utilized   silicone foam dressing in place   transparent dressing maintained  Taken 4/14/2025 0000 by Lynne Cuevas RN  Pressure Reduction Techniques:   frequent weight shift encouraged   heels elevated off bed   pressure points protected  Head of Bed (HOB) Positioning: [facility] elevated  Pressure Reduction Devices:   pressure-redistributing mattress utilized   positioning supports utilized   heel offloading device utilized  Skin Protection:   incontinence pads utilized   transparent dressing maintained  Taken 4/13/2025 2200 by Lynne Cuevas RN  Pressure Reduction Techniques:   frequent weight shift encouraged   heels elevated off bed   pressure points protected  Head of Bed (HOB) Positioning: [facility] elevated  Pressure Reduction Devices:   pressure-redistributing mattress utilized   positioning supports utilized   heel offloading device utilized  Skin Protection:   incontinence pads utilized   transparent dressing maintained  Taken 4/13/2025 2140 by Lynne Cuevas RN  Pressure Reduction Techniques:   frequent weight shift encouraged   heels elevated off bed   pressure points protected  Head of Bed  (HOB) Positioning: HOB elevated  Pressure Reduction Devices:   pressure-redistributing mattress utilized   positioning supports utilized   heel offloading device utilized  Skin Protection:   incontinence pads utilized   transparent dressing maintained   Goal Outcome Evaluation:

## 2025-04-14 NOTE — CASE MANAGEMENT/SOCIAL WORK
Case Management Discharge Note      Final Note: Patient's plan is an acute rehab bed on Stroke unit at [facility], 4/14.  Nurse to call report to [phone].  CM will fax discharge summary when available to [phone].  [facility] will transport at 1430.  [facility] the [facility] by 1420.         Selected Continued Care - Admitted Since 4/11/2025       Destination Coordination complete.      Service Provider Services Address Phone Fax Patient Preferred    [facility] [address] [address] [phone] --              Durable Medical Equipment    No services have been selected for the patient.                Dialysis/Infusion    No services have been selected for the patient.                Home Medical Care    No services have been selected for the patient.                Therapy    No services have been selected for the patient.                Community Resources    No services have been selected for the patient.                Community & DME    No services have been selected for the patient.                    Transportation Services  W/C Van: Other ([facility])    Final Discharge Disposition Code: 62 - inpatient rehab facility

## 2025-04-14 NOTE — DISCHARGE PLACEMENT REQUEST
"Jsese Chapin (80 y.o. Male)     Maria G Huitron, RN  [phone]        Date of Birth   1945    Social Security Number       Address   [address] [place], [place] [address]    Home Phone   [phone]    MRN   [id number]       Taoist   None    Marital Status                               Admission Date   4/11/2025    Admission Type   Emergency    Admitting Provider   Colton Paz MD    Attending Provider   Colton Paz MD    Department, Room/Bed   [facility] 3E, S336/1       Discharge Date       Discharge Disposition   Rehab Facility or Unit (DC - External)    Discharge Destination                                 Attending Provider: Colton Paz MD    Allergies: No Known Allergies    Isolation: None   Infection: None   Code Status: CPR    Ht: 170.2 cm (67.01\")   Wt: 75 kg (165 lb 5.5 oz)    Admission Cmt: None   Principal Problem: AIS s/p TNK [I63.9]                   Active Insurance as of 4/11/2025       Primary Coverage       Payor Plan Insurance Group Employer/Plan Group    MEDICARE MEDICARE A & B        Payor Plan Address Payor Plan Phone Number Payor Plan Fax Number Effective Dates    [address] [phone]  12/1/2007 - None Entered    [facility] [address]         Subscriber Name Subscriber Birth Date Member ID       JESSE CHAPIN 1945 [id number]               Secondary Coverage       Payor Plan Insurance Group Employer/Plan Group    [facility] SUPP [id number]       Payor Plan Address Payor Plan Phone Number Payor Plan Fax Number Effective Dates    [address]   12/1/2016 - None Entered    [facility] [address]         Subscriber Name Subscriber Birth Date Member ID       JESSE CHAPIN 1945 [id number]                     Emergency Contacts        (Rel.) Home Phone Work Phone Mobile Phone    SHONA CHAPIN (Spouse) [phone] -- --                 Discharge Summary        Colton Paz MD at 04/14/25 1032  "             [facility] Medicine Services  DISCHARGE SUMMARY    Patient Name: Jesse Chapin  : 1945  MRN: [id number]    Date of Admission: 2025 12:28 PM  Date of Discharge:  2025  Primary Care Physician: Andree Burroughs MD    Consults       Date and Time Order Name Status Description    2025 12:26 PM Inpatient Neurology Consult Stroke              Hospital Course     Presenting Problem: CVA    Active Hospital Problems    Diagnosis  POA    **AIS s/p TNK [I63.9]  Yes    Dyslipidemia [E78.5]  Yes    T2DM (type 2 diabetes mellitus) [E11.9]  Yes      Resolved Hospital Problems   No resolved problems to display.          Hospital Course:  Jesse Chapin is a 80 y.o. male with history of DM, DL, migraines, here with acute L weakness, s/p TNK for AIS.      AIS s/p TNK, R frontal/parietal  -DAPT/statin  -PT/OT, rehab  -Holter at DC  -Stroke neurology follow up 1 month     DM  -relatively stable trend, resume PO medications at DC     DL  -statin    Discharge Follow Up Recommendations for outpatient labs/diagnostics:  As written    Day of Discharge     HPI:  L weakness/LLE. Otherwise no symptoms. No f/c. NO n/v. No dysphagia/dysarthria.         Objective  Objective      Vital Signs:   Temp:  [97.8 °F (36.6 °C)-99 °F (37.2 °C)] 98.1 °F (36.7 °C)  Heart Rate:  [] 81  Resp:  [14-20] 16  BP: ()/(57-92) 114/68     Physical Exam:  NAD, alert and oriented  OP clear, dry MM  RRR  Decreased at bases  +BS, soft  ANTHONY  Normal affect    Pertinent  and/or Most Recent Results     LAB RESULTS:      Lab 25  0452 25  1240 25  1236   WBC 7.52  --  5.82   HEMOGLOBIN 13.4  --  13.5   HEMOGLOBIN, POC  --  13.9  --    HEMATOCRIT 40.6  --  42.2   HEMATOCRIT POC  --  41  --    PLATELETS 164  --  152   NEUTROS ABS 4.89  --  3.21   IMMATURE GRANS (ABS) 0.03  --  0.02   LYMPHS ABS 1.54  --  1.70   MONOS ABS 0.74  --  0.59   EOS ABS 0.27  --  0.26   MCV 90.0  --  92.5   PROTIME   --   --  13.7   APTT  --   --  28.8         Lab 04/12/25  0452 04/11/25  1240 04/11/25  1236   SODIUM 136  --  137   POTASSIUM 4.3  --  4.5   CHLORIDE 104  --  102   CO2 24.0  --  25.0   ANION GAP 8.0  --  10.0   BUN 16  --  15   CREATININE 0.69* 1.00 0.89   EGFR 92.4 75.1 85.6   GLUCOSE 147*  --  251*   CALCIUM 9.5  --  9.7   HEMOGLOBIN A1C 8.00*  --   --    TSH 0.971  --   --          Lab 04/11/25  1236   TOTAL PROTEIN 6.7   ALBUMIN 3.6   GLOBULIN 3.1   ALT (SGPT) 13  13   AST (SGOT) 17  18   BILIRUBIN 1.0   ALK PHOS 63         Lab 04/11/25  1236   PROTIME 13.7   INR 0.99         Lab 04/12/25  0452   CHOLESTEROL 104   LDL CHOL 39   HDL CHOL 47   TRIGLYCERIDES 93             Brief Urine Lab Results       None          Microbiology Results (last 10 days)       ** No results found for the last 240 hours. **            CT Head Without Contrast  Result Date: 4/12/2025  CT HEAD WO CONTRAST Date of Exam: 4/12/2025 1:29 PM EDT Indication: Stroke, follow up 24 hour post-TNK 24 Hours Post Thrombolytic Administration. Comparison: Brain MRI 4/11/2025 Technique: Axial CT images were obtained of the head without contrast administration.  Automated exposure control and iterative construction methods were used. Findings: No acute intracranial hemorrhage.. Evolving acute infarcts in the right frontal and parietal lobes. No extra-axial fluid collection.No significant mass effect. No hydrocephalus. Mild generalized parenchymal volume loss. Scattered areas of periventricular and subcortical white matter hypoattenuation, nonspecific, perhaps from small vessel ischemic/hypertensive changes in a patient of this age.There are intracranial atherosclerotic calcifications. Mild scattered mucosal thickening in the paranasal sinuses.Mastoid air cells are essentially clear.Included globes and orbits appear unremarkable by CT. No acute or aggressive appearing bony or extracranial soft tissue process.     Impression: Evolving acute infarcts in  the right frontal and parietal lobes. No acute intracranial hemorrhage. Electronically Signed: Kevin Luque  4/12/2025 1:56 PM EDT  Workstation ID: [id number]    XR Chest 1 View  Result Date: 4/11/2025  XR CHEST 1 VW Date of Exam: 4/11/2025 1:40 PM EDT Indication: Acute Stroke Protocol (onset < 12 hrs) Comparison: None available. Findings: Heart size and pulmonary vasculature are within normal limits lungs clear other than mild atelectasis in the right lung base. Costophrenic angle sharp     Impression: No active cardiopulmonary disease Electronically Signed: Germán Stokes  4/11/2025 2:06 PM EDT  Workstation ID: [id number]    MRI Brain WO CON Hyper Acute Stroke Protocol  Result Date: 4/11/2025  MRI BRAIN WO CON HYPER ACUTE STROKE PROTOCOL Date of Exam: 4/11/2025 1:20 PM EDT Indication: balance dificulty, left arm incoordination.  Comparison: Same day CT stroke Technique:  Abbreviated hyper-acute stroke brain MRI protocol was utilized. The following sequences were obtained, without contrast administration: multiplanar localizer images, axial diffusion, axial ADC, axial FLAIR. Findings: Small cortical infarcts seen along the right frontal and parietal lobes. There is no associated FLAIR signal abnormality at these areas suggesting early/hyperacute infarct. Mild parenchymal volume loss. No midline shift or herniation. Scattered periventricular and subcortical FLAIR hyperintensities are nonspecific but most often seen with chronic small vessel ischemic changes. Scattered paranasal sinus mucosal thickening.     Impression: Small cortical infarcts seen along the right frontal and parietal lobes. There is no associated FLAIR signal abnormality at these areas suggesting early/hyperacute infarct. Findings of infarct discussed with NP KASHIF HERNANDEZ by Dr. Drew Browning via telephone on 4/11/2025 1:29 PM EDT. Electronically Signed: Drew Browning MD  4/11/2025 1:33 PM EDT  Workstation ID: [id number]    CT  Angiogram Head w AI Analysis of LVO  Result Date: 4/11/2025  CT ANGIOGRAM HEAD W AI ANALYSIS OF LVO, CT ANGIOGRAM NECK Date of Exam: 4/11/2025 12:40 PM EDT Indication: Neuro Deficit, acute, Stroke suspected Neuro deficit, acute stroke suspected. Comparison: None available. Technique: CTA of the head and neck was performed after the uneventful intravenous administration of 125 cc Isovue-370. Reconstructed coronal and sagittal images were also obtained. In addition, a 3-D volume rendered image was created for interpretation.  Automated exposure control and iterative reconstruction methods were used. Findings: CTA NECK: *Aortic arch: No aneurysm. No significant stenosis or occlusion of the major arch vessels. *Left carotid system: Mild to moderate distal CCA and proximal ICA atherosclerotic disease with less than 50%. No aneurysm, additional stenosis or occlusion. *Right carotid system: Moderate distal CCA and proximal ICA atherosclerotic disease with approximately 50% stenosis of the proximal right ICA. No aneurysm, additional stenosis or occlusion. *Vertebrobasilar system: The vertebral arteries arise from their respective subclavian arteries. No aneurysm, significant stenosis or occlusion. CTA HEAD: *Anterior circulation: No aneurysm, significant stenosis or occlusion. *Posterior circulation: No aneurysm, significant stenosis or occlusion. *Anatomic variants: None of significance. *Venous sinuses: Patent.     1.No hemodynamically significant stenosis, large vessel cut off or aneurysms in the intracranial circulation 2.Mild to moderate carotid atherosclerotic disease, right greater than left, with approximately 50% stenosis involving the proximal right ICA. Electronically Signed: Warren Beasley MD  4/11/2025 1:05 PM EDT  Workstation ID: [id number]    CT Angiogram Neck  Result Date: 4/11/2025  CT ANGIOGRAM HEAD W AI ANALYSIS OF LVO, CT ANGIOGRAM NECK Date of Exam: 4/11/2025 12:40 PM EDT Indication: Neuro Deficit, acute,  Stroke suspected Neuro deficit, acute stroke suspected. Comparison: None available. Technique: CTA of the head and neck was performed after the uneventful intravenous administration of 125 cc Isovue-370. Reconstructed coronal and sagittal images were also obtained. In addition, a 3-D volume rendered image was created for interpretation.  Automated exposure control and iterative reconstruction methods were used. Findings: CTA NECK: *Aortic arch: No aneurysm. No significant stenosis or occlusion of the major arch vessels. *Left carotid system: Mild to moderate distal CCA and proximal ICA atherosclerotic disease with less than 50%. No aneurysm, additional stenosis or occlusion. *Right carotid system: Moderate distal CCA and proximal ICA atherosclerotic disease with approximately 50% stenosis of the proximal right ICA. No aneurysm, additional stenosis or occlusion. *Vertebrobasilar system: The vertebral arteries arise from their respective subclavian arteries. No aneurysm, significant stenosis or occlusion. CTA HEAD: *Anterior circulation: No aneurysm, significant stenosis or occlusion. *Posterior circulation: No aneurysm, significant stenosis or occlusion. *Anatomic variants: None of significance. *Venous sinuses: Patent.     1.No hemodynamically significant stenosis, large vessel cut off or aneurysms in the intracranial circulation 2.Mild to moderate carotid atherosclerotic disease, right greater than left, with approximately 50% stenosis involving the proximal right ICA. Electronically Signed: Warren Beasley MD  4/11/2025 1:05 PM EDT  Workstation ID: [id number]    CT CEREBRAL PERFUSION WITH & WITHOUT CONTRAST  Result Date: 4/11/2025  CT CEREBRAL PERFUSION W WO CONTRAST Date of Exam: 4/11/2025 12:40 PM EDT Indication: Neuro Deficit, acute, Stroke suspected Neuro deficit, acute stroke suspected.  Comparison: None available. Technique: Axial CT images of the brain were obtained prior to and after the administration of 125  cc Isovue-370. Core blood volume, core blood flow, mean transit time, and Tmax images were obtained utilizing the Rapid software protocol. A limited CT angiogram of the head was also performed to measure the blood vessel density. The radiation dose reduction device was turned on for each scan per the ALARA (As Low as Reasonably Achievable) protocol. Findings:    Cerebral blood flow, blood volume, and mean transit time maps are symmetric without large perfusion defect. There are areas of elevated Tmax greater than 4 seconds scattered throughout the right and left cerebellum and right cerebral hemisphere of uncertain significance.  CBF<30% volume: 0mL Tmax>6sec volume: 0 mL Mismatch volume: 0mL Mismatch ratio: None          1. No evidence of core infarct nor ischemic brain at risk. Electronically Signed: Warren Beasley MD  4/11/2025 1:02 PM EDT  Workstation ID: [id number]    CT Head Without Contrast Stroke Protocol  Result Date: 4/11/2025  CT HEAD WO CONTRAST STROKE PROTOCOL Date of Exam: 4/11/2025 12:32 PM EDT Indication: Neuro deficit, acute, stroke suspected Neuro Deficit, acute, Stroke suspected. Comparison: None available. Technique: Axial CT images were obtained of the head without contrast administration.  Reconstructed coronal images were also obtained. Automated exposure control and iterative construction methods were used. Scan Time: 12:34 p.m. Results discussed with stroke team at 12:40 p.m. Findings: Parenchyma:No acute intraparenchymal hemorrhage. No loss of gray-white differentiation to suggest large territory infarct. Moderate parenchymal volume loss. Scattered periventricular and subcortical white matter hypodensities, nonspecific, but most often  consistent with small vessel ischemic changes. Age-indeterminate but possibly old small infarct in the right frontal lobe. No midline shift or herniation. Ventricles and extra axial spaces:Prominent ventricles and sulci secondary to volume loss. No extra axial  fluid collection seen. Other:Orbits are grossly intact. Paranasal sinuses are clear. Mastoid air cells are clear. Calvarium is intact. Intracranial atherosclerotic calcification is present.     Impression: 1.No evidence of acute intracranial hemorrhage or large territory infarct. 2.Age-indeterminate but possibly old small infarct in the right frontal lobe. 3.Chronic changes as above. Electronically Signed: Drew Browning MD  4/11/2025 12:42 PM EDT  Workstation ID: [id number]              Results for orders placed during the hospital encounter of 04/11/25    Adult Transthoracic Echo Complete W/ Cont if Necessary Per Protocol (With Agitated Saline)    Interpretation Summary    Left ventricular systolic function is normal. Estimated left ventricular EF = 60%    Trace to mild tricuspid regurgitation with normal RVSP.      Plan for Follow-up of Pending Labs/Results: Reviewed    Discharge Details        Discharge Medications        New Medications        Instructions Start Date   aspirin 81 MG chewable tablet   81 mg, Oral, Daily   Start Date: April 15, 2025     clopidogrel 75 MG tablet  Commonly known as: PLAVIX   75 mg, Oral, Daily   Start Date: April 15, 2025            Changes to Medications        Instructions Start Date   atorvastatin 20 MG tablet  Commonly known as: LIPITOR  What changed: how much to take   40 mg, Oral, Daily             Continue These Medications        Instructions Start Date   ferrous sulfate 324 (65 Fe) MG tablet delayed-release EC tablet   324 mg, Daily With Breakfast      multivitamin with minerals tablet  Generic drug: multivitamin with minerals   1 tablet, Daily      Semaglutide 14 MG tablet   1 tablet, Daily      SITagliptin 100 MG tablet  Commonly known as: JANUVIA   100 mg, Daily      vitamin B-12 500 MCG tablet  Commonly known as: CYANOCOBALAMIN   500 mcg, Daily             Stop These Medications      acetaminophen 500 MG tablet  Commonly known as: TYLENOL              No Known  Allergies      Discharge Disposition:  Rehab Facility or Unit (DC - External)    Diet:  Hospital:  Diet Order   Procedures    Diet: Regular/House, Diabetic; Consistent Carbohydrate; Fluid Consistency: Thin (IDDSI 0)            Activity:      Restrictions or Other Recommendations:         CODE STATUS:    Code Status and Medical Interventions: CPR (Attempt to Resuscitate); Full Support   Ordered at: 04/11/25 5985     Code Status (Patient has no pulse and is not breathing):    CPR (Attempt to Resuscitate)     Medical Interventions (Patient has pulse or is breathing):    Full Support       No future appointments.    Additional Instructions for the Follow-ups that You Need to Schedule       Ambulatory Referral to [facility] [place] - [facility]   As directed      Service Requested: Cardiac Monitor        Ambulatory Referral to Neurology   As directed      1 month post hospital follow-up    Order Comments: 1 month post hospital follow-up         Discharge Follow-up with PCP   As directed       Currently Documented PCP:    Andree Burroughs MD    PCP Phone Number:    [phone]     Follow Up Details: 1 week after rehab                      Colton Paz MD  04/14/25      Time Spent on Discharge:  I spent  32  minutes on this discharge activity which included: face-to-face encounter with the patient, reviewing the data in the system, coordination of the care with the nursing staff as well as consultants, documentation, and entering orders.            Electronically signed by Colton Paz MD at 04/14/25 1840

## 2025-04-14 NOTE — CONSULTS
"Diabetes Education    Patient Name:  Jesse Chapin  YOB: 1945  MRN: [id number]  Admit Date:  4/11/2025    Consult for diabetes education received per stroke protocol. Chart reviewed. Pt was seen at bedside today. Permission given for visit.     Patient's current A1C is 8%. Patient with type 2 diabetes. Home medications consist of januvia and rybelsus. Has been taking rybelsus for 1.5 years and lost 15 lbs. Patient does not monitor his blood sugar daily, due to needle fatigue.     Discussed different CGM sensors, including dexcom g 6/7, edmond 2/3 and stelo. Discussed 20% difference between fingerstick and CGM. Stressed importance to perform a fingerstick when CGM reads too high or too low or when reading is not matching patient symptoms. Provided Patient with [employer]'s \"Continuous Glucose monitors\" handout. Discussed target range of  fasting and  postprandial and goal of A1C <7% to reduce complications like heart disease and stroke.    Discussed increased risk of a stroke with diabetes, htn, hld, older age, being overweight and smoking as well as history of previous stroke. Discussed choosing high fiber foods including whole grains, fresh fruits, and vegetables. Also encouraged choosing lean proteins like chicken, fish, beans, and nuts and avoiding foods high in salt, cholesterol, and processed foods. Provided patient with "[employer]'s \"Stroke Prevention\" handout.     Patient does not qualify for the follow up stroke class based on the exclusion criteria of discharge to inpatient rehab.    Total time spent reviewing chart, preparing education/materials, providing education at bedside, and coordinating care approx 30 minutes.    Thank you for this consult.    Electronically signed by:  Sybil Hoyt RN  04/14/25 12:53 EDT  "

## 2025-04-22 ENCOUNTER — OFFICE VISIT (OUTPATIENT)
Dept: CARDIOLOGY | Facility: HOSPITAL | Age: 80
End: 2025-04-22
Payer: MEDICARE

## 2025-04-22 ENCOUNTER — HOSPITAL ENCOUNTER (OUTPATIENT)
Dept: CARDIOLOGY | Facility: HOSPITAL | Age: 80
Discharge: HOME | End: 2025-04-22
Payer: MEDICARE

## 2025-04-22 VITALS
DIASTOLIC BLOOD PRESSURE: 59 MMHG | HEART RATE: 83 BPM | OXYGEN SATURATION: 93 % | WEIGHT: 162.2 LBS | HEIGHT: 67 IN | SYSTOLIC BLOOD PRESSURE: 108 MMHG | RESPIRATION RATE: 18 BRPM | BODY MASS INDEX: 25.46 KG/M2

## 2025-04-22 DIAGNOSIS — I63.9 CEREBROVASCULAR ACCIDENT (CVA), UNSPECIFIED MECHANISM: ICD-10-CM

## 2025-04-22 DIAGNOSIS — I67.841 REVERSIBLE CEREBROVASCULAR VASOCONSTRICTION SYNDROME: ICD-10-CM

## 2025-04-22 DIAGNOSIS — I63.9 CEREBROVASCULAR ACCIDENT (CVA), UNSPECIFIED MECHANISM: Primary | ICD-10-CM

## 2025-04-22 PROCEDURE — 1159F MED LIST DOCD IN RCRD: CPT | Performed by: NURSE PRACTITIONER

## 2025-04-22 PROCEDURE — 93005 ELECTROCARDIOGRAM TRACING: CPT | Performed by: NURSE PRACTITIONER

## 2025-04-22 PROCEDURE — 99213 OFFICE O/P EST LOW 20 MIN: CPT | Performed by: NURSE PRACTITIONER

## 2025-04-22 PROCEDURE — 1160F RVW MEDS BY RX/DR IN RCRD: CPT | Performed by: NURSE PRACTITIONER

## 2025-04-22 RX ORDER — FAMOTIDINE 20 MG/1
20 TABLET, FILM COATED ORAL
COMMUNITY
Start: 2025-04-18

## 2025-04-22 RX ORDER — HYDROCODONE/ACETAMINOPHEN 5 MG-500MG
20 TABLET ORAL DAILY
COMMUNITY

## 2025-04-22 RX ORDER — ESCITALOPRAM OXALATE 10 MG/1
10 TABLET ORAL DAILY
COMMUNITY
Start: 2025-04-17

## 2025-04-22 NOTE — PROGRESS NOTES
"Chief Complaint  Cerebrovascular Accident    Subjective      History of Present Illness {  Problem List  Visit  Diagnosis   Encounters  Notes  Medications  Labs  Result Review Imaging  Media :23}     Jesse Chapin, 80 y.o. male with recent CVA, DMT2 (HgbA1c 8%), HLD, GERD, migraine, left hip fracture s/p nailing (2023) presents to [facility] At[facility] Fibrillation/arrhythmia clinic for Cerebrovascular Accident.    Patient recently presented to [facility] ED on 4/ 11/25 with gait instability and left hand coordination.  LKN 4/ 11/25 at 0900.  CT head revealed age-indeterminate right frontal lobe infarct.  CT angiogram head and neck showed mild right ICA stenoses. MRI revealed flair negative acute right frontal and parietal lobe infarct. Patient was consented and TNK was administered.  Patient was naïve to antiplatelets and anticoagulation prior to arrival.  24-hour post TNK CT head did not show bleed.  TTE 4/ 11/25 LVEF 60%, normal left atrium, saline test not performed, no thrombus. Patient recommended to follow-up in [facility] for 30 day MCOT.    Presents today with continued recovery after recent hospitalization. Recently completed 4 days of rehab at [facility]. Overall without cardiovascular complaints. Denies any prior palpitation/tachypalpitation besides in his youth.       Objective     Vital Signs:   Vitals:    04/22/25 1511 04/22/25 1512   BP: 117/64 108/59   BP Location: Left arm Left arm   Patient Position: Sitting Standing   Cuff Size: Adult Adult   Pulse: 81 83   Resp: 18    SpO2: 94% 93%   Weight: 73.6 kg (162 lb 3.2 oz)    Height: 170.2 cm (67\")      Body mass index is 25.4 kg/m².  Physical Exam  Vitals and nursing note reviewed.   Constitutional:       Appearance: Normal appearance.   HENT:      Head: Normocephalic.   Eyes:      Extraocular Movements: Extraocular movements intact.   Neck:      Vascular: No carotid bruit.   Cardiovascular:      Rate and Rhythm: Normal rate and " regular rhythm.      Pulses: Normal pulses.      Heart sounds: Normal heart sounds, S1 normal and S2 normal. No murmur heard.  Pulmonary:      Effort: Pulmonary effort is normal. No respiratory distress.      Breath sounds: Normal breath sounds.   Musculoskeletal:      Cervical back: Neck supple.      Right lower leg: No edema.      Left lower leg: No edema.   Skin:     General: Skin is warm and dry.   Neurological:      General: No focal deficit present.      Mental Status: He is alert.   Psychiatric:         Mood and Affect: Mood normal.         Behavior: Behavior normal.         Thought Content: Thought content normal.        Data Reviewed:{ Labs  Result Review  Imaging  Med Tab  Media :23}     TSH Rfx On Abnormal To Free T4 (04/12/2025 04:52)  Basic Metabolic Panel (04/12/2025 04:52)  CBC & Differential (04/12/2025 04:52)  Lipid Panel (04/12/2025 04:52)  CT Angiogram Neck (04/11/2025 12:51)   ECG 12 Lead (04/22/2025 15:17)  Adult Transthoracic Echo Complete W/ Cont if Necessary Per Protocol (With Agitated Saline) (04/12/2025 11:00)      Assessment & Plan   Assessment and Plan {CC Problem List  Visit Diagnosis  ROS  Review (Popup)  Health Maintenance  Quality  BestPractice  Medications  SmartSets  [employer] Encounters  Media :23}     1. Cerebrovascular accident (CVA), unspecified mechanism  -Recently presented to [facility] ED on 4/ 11/25 with gait instability and left hand coordination.  LKN 4/ 11/25 at 0900.    -CT head revealed age-indeterminate right frontal lobe infarct.  CT angiogram head and neck showed mild right ICA stenoses.   -MRI revealed flair negative acute right frontal and parietal lobe infarct. -24-hour post TNK CT head did not show bleed.    -TTE 4/ 11/25 LVEF 60%, normal left atrium, saline test not performed, no thrombus.  -ECG today with NSR  -Continue DAPT, atorvastatin as prescribed  -30d MCOT for AF surveillance  -Continue stroke neurology follow-up as scheduled      Follow Up  {Instructions Charge Capture  Follow-up Communications :23}     Return in about 6 weeks (around 6/3/2025) for Office follow-up, Monitor results.      Patient was given instructions and counseling regarding his condition or for health maintenance advice. Please see specific information pulled into the AVS if appropriate. Patient was instructed to call the [facility] with any questions, concerns, or worsening symptoms.    Dictated Utilizing Dragon Dictation   Please note that portions of this note were completed with a voice recognition program. Part of this note may be an electronic transcription/translation of spoken language to printed text using the Dragon Dictation System.

## 2025-04-22 NOTE — PROGRESS NOTES
[facility] Heart Monitor Documentation    Jesse Chapin  1945  [id number]  04/22/25      [] ZIO XT Patch  Model [id number] Prescribed for  Days    Serial Number: (N + 9 Digits) N   Apply-By Date on Box:   USPS Tracking Number:   USPS Tracking        [x] [employer] MINI PLUS Mobile Cardiac Telemetry  Model BGMINIPLUS Prescribed for 30 Days    Serial Number: (BGM + 7 Digits) [id number]  Shipped-By Date on Box:   UPS Tracking Number: 1Z  UPS Tracking      [] [employer] MINI Holter Monitor  Model BGMINIEL Prescribed for  Days    Serial Number: (7 Digits)   Shipped-By Date on Box:   UPS Tracking Number: [id number]  UPS Tracking        This monitor was applied to the patient's chest and checked for proper functioning.  Mr. Jesse Chapin was instructed in the proper use of this monitor.  He was given the opportunity to ask questions and left the office with the device 's instruction manual.    Ruby Castillo MA, 16:04 EDT, 04/22/25                  [facility]

## 2025-04-23 LAB
QT INTERVAL: 400 MS
QTC INTERVAL: 450 MS

## 2025-05-30 ENCOUNTER — TELEPHONE (OUTPATIENT)
Dept: NEUROLOGY | Facility: CLINIC | Age: 80
End: 2025-05-30
Payer: MEDICARE

## 2025-05-30 NOTE — TELEPHONE ENCOUNTER
Called patient to reschedule his 5/14/25 appointment with the Neurology Stroke Clinic. Patient states he has a Neurosurgeon and a Neurologist already at [facility].  He does not wish to reschedule at this time.

## 2025-06-03 ENCOUNTER — OFFICE VISIT (OUTPATIENT)
Dept: CARDIOLOGY | Facility: HOSPITAL | Age: 80
End: 2025-06-03
Payer: MEDICARE

## 2025-06-03 VITALS
OXYGEN SATURATION: 97 % | RESPIRATION RATE: 18 BRPM | BODY MASS INDEX: 24.51 KG/M2 | HEIGHT: 67 IN | HEART RATE: 87 BPM | WEIGHT: 156.13 LBS | DIASTOLIC BLOOD PRESSURE: 58 MMHG | SYSTOLIC BLOOD PRESSURE: 105 MMHG

## 2025-06-03 DIAGNOSIS — I65.21 STENOSIS OF RIGHT CAROTID ARTERY: Primary | ICD-10-CM

## 2025-06-03 DIAGNOSIS — I63.9 CEREBROVASCULAR ACCIDENT (CVA), UNSPECIFIED MECHANISM: ICD-10-CM

## 2025-06-03 PROCEDURE — 99214 OFFICE O/P EST MOD 30 MIN: CPT | Performed by: NURSE PRACTITIONER

## 2025-06-03 PROCEDURE — 1160F RVW MEDS BY RX/DR IN RCRD: CPT | Performed by: NURSE PRACTITIONER

## 2025-06-03 PROCEDURE — 1159F MED LIST DOCD IN RCRD: CPT | Performed by: NURSE PRACTITIONER

## 2025-06-03 RX ORDER — TRAMADOL HYDROCHLORIDE 50 MG/1
50 TABLET ORAL
COMMUNITY
Start: 2025-05-19

## 2025-06-03 RX ORDER — DENOSUMAB 60 MG/ML
INJECTION SUBCUTANEOUS
COMMUNITY
Start: 2025-05-23

## 2025-06-03 NOTE — PROGRESS NOTES
Chief Complaint  Follow-up    Subjective      History of Present Illness {  Problem List  Visit  Diagnosis   Encounters  Notes  Medications  Labs  Result Review Imaging  Media :23}     Jesse Chapin, 80 y.o. male with recent CVA, DMT2 (HgbA1c 8%), HLD, GERD, migraine, left hip fracture s/p nailing (2023) presents to [facility] Heart and Valve Atrial Fibrillation/arrhythmia clinic for Follow-up.    Since previous evaluation patient completed cardiac monitor with diagnostic time approximately 27 days, 23 hours.  No atrial fibrillation on cardiac monitor.  Average heart rate 76.  Low burden PAC/PVC less than 1%.  Short duration WCT that was reported as probable PAT with aberrancy.    Patient presents today feeling well overall from a cardiovascular standpoint since previous evaluation. Unfortunately he had a mechanical fall after discharge from rehabilitation and underwent orthopedic procedure due to T12 compression fracture.  Reports he is recovering well from this.  No stroke clinic follow-up due to him having primary neurology with [facility], Dr. Cosme Ramirez.      Previous evaluation:  Patient recently presented to [facility] ED on 4/ 11/25 with gait instability and left hand coordination.  LKN 4/ 11/25 at 0900.  CT head revealed age-indeterminate right frontal lobe infarct.  CT angiogram head and neck showed mild right ICA stenoses. MRI revealed flair negative acute right frontal and parietal lobe infarct. Patient was consented and TNK was administered.  Patient was naïve to antiplatelets and anticoagulation prior to arrival.  24-hour post TNK CT head did not show bleed.  TTE 4/ 11/25 LVEF 60%, normal left atrium, saline test not performed, no thrombus. Patient recommended to follow-up in [facility] for 30 day MCOT.    Presents today with continued recovery after recent hospitalization. Recently completed 4 days of rehab at [facility]. Overall without cardiovascular complaints. Denies any prior  "palpitation/tachypalpitation besides in his youth.       Objective     Vital Signs:   Vitals:    06/03/25 1034   BP: 105/58   BP Location: Right arm   Patient Position: Sitting   Cuff Size: Adult   Pulse: 87   Resp: 18   SpO2: 97%   Weight: 70.8 kg (156 lb 2 oz)   Height: 170.2 cm (67\")     Body mass index is 24.45 kg/m².  Physical Exam  Vitals and nursing note reviewed.   Constitutional:       Appearance: Normal appearance.   HENT:      Head: Normocephalic.   Eyes:      Extraocular Movements: Extraocular movements intact.   Neck:      Vascular: No carotid bruit.   Cardiovascular:      Rate and Rhythm: Normal rate and regular rhythm.      Pulses: Normal pulses.      Heart sounds: Normal heart sounds, S1 normal and S2 normal. No murmur heard.  Pulmonary:      Effort: Pulmonary effort is normal. No respiratory distress.      Breath sounds: Normal breath sounds.   Musculoskeletal:      Cervical back: Neck supple.      Right lower leg: No edema.      Left lower leg: No edema.   Skin:     General: Skin is warm and dry.   Neurological:      General: No focal deficit present.      Mental Status: He is alert.   Psychiatric:         Mood and Affect: Mood normal.         Behavior: Behavior normal.         Thought Content: Thought content normal.        Data Reviewed:{ Labs  Result Review  Imaging  Med Tab  Media :23}       Cardiac Event Monitor (DARA) or Mobile Cardiac Outpatient Telemetry (MCT) (04/22/2025 16:24)     TSH Rfx On Abnormal To Free T4 (04/12/2025 04:52)  Basic Metabolic Panel (04/12/2025 04:52)  CBC & Differential (04/12/2025 04:52)  Lipid Panel (04/12/2025 04:52)  CT Angiogram Neck (04/11/2025 12:51)   ECG 12 Lead (04/22/2025 15:17)  Adult Transthoracic Echo Complete W/ Cont if Necessary Per Protocol (With Agitated Saline) (04/12/2025 11:00)      Assessment & Plan   Assessment and Plan {CC Problem List  Visit Diagnosis  ROS  Review (Popup)  Health Maintenance  Quality  BestPractice  Medications  " More  SnapShot Encounters  Media :23}     1. Cerebrovascular accident (CVA), unspecified mechanism  -Recently presented to [facility] ED on 4/ 11/25 with gait instability and left hand coordination.  LKN 4/ 11/25 at 0900.    -CT head revealed age-indeterminate right frontal lobe infarct.  CT angiogram head and neck showed mild right ICA stenoses.   -MRI revealed flair negative acute right frontal and parietal lobe infarct. -24-hour post TNK CT head did not show bleed.    -TTE 4/ 11/25 LVEF 60%, normal left atrium, saline test not performed, no thrombus.  -Cardiac monitor with diagnostic time approximately 27 days, 23 hours.  No atrial fibrillation on cardiac monitor.  Average heart rate 76.  Low burden PAC/PVC less than 1%.  Short duration WCT that was reported as probable PAT with aberrancy.  -Continue DAPT, atorvastatin as prescribed  -Continue neurology follow-up as scheduled, will forward results to PCP/primary neurology.    2. Carotid Stenosis  -Neck CTA during CVA hospitalization with mild to moderate carotid atherosclerotic disease, right greater than left, with approximately 50% stenosis involving the proximal right ICA.   -Continue ASA, statin as prescribed  - Discussed options of establishing with [facility] cardiology; he would like to continue with care through the [facility].  He will discuss with neurologist on surveillance and may consider referral to [facility] cardiology if they think this is needed.  Discussed contact myself if he would like to establish with [facility] cardiology or cardiac needs arise.      Follow Up {Instructions Charge Capture  Follow-up Communications :23}     Return if symptoms worsen or fail to improve.      Patient was given instructions and counseling regarding his condition or for health maintenance advice. Please see specific information pulled into the AVS if appropriate. Patient was instructed to call the [facility] Center with any  questions, concerns, or worsening symptoms.    Dictated Utilizing Dragon Dictation   Please note that portions of this note were completed with a voice recognition program. Part of this note may be an electronic transcription/translation of spoken language to printed text using the Dragon Dictation System.

## 2025-07-02 ENCOUNTER — CALL CENTER PROGRAMS (OUTPATIENT)
Dept: CALL CENTER | Facility: HOSPITAL | Age: 80
End: 2025-07-02
Payer: MEDICARE

## 2025-07-02 NOTE — OUTREACH NOTE
Stroke Primo Survey      Flowsheet Row Responses   Facility patient discharged from? [facility]   Attempt successful Yes   Call start time 1026   Person spoke with today (if not patient) and relationship Caregiver   Did the patient die within 30 days of admission? No   Did the patient die within 30 days of discharge? No   Call end time 1029   Patient location 30 days post discharge if known Short term rehab   Was the patient readmitted within 30 days of discharge? No   Could you live alone without any help from another person? Yes   Can you do everything that you were doing right before your stroke even if slower and not as much? Yes   Are you completely back to the way you were right before your stroke? No   Can you walk from one room to another without help from another person? Yes   Can the patient sit up in bed without any help? Yes   Call Center Farina Score 1   Farina score call completed Yes            Nereyda Mak Registered Nurse